# Patient Record
Sex: MALE | Race: ASIAN | NOT HISPANIC OR LATINO
[De-identification: names, ages, dates, MRNs, and addresses within clinical notes are randomized per-mention and may not be internally consistent; named-entity substitution may affect disease eponyms.]

---

## 2017-09-14 PROBLEM — Z00.00 ENCOUNTER FOR PREVENTIVE HEALTH EXAMINATION: Status: ACTIVE | Noted: 2017-09-14

## 2017-09-28 ENCOUNTER — APPOINTMENT (OUTPATIENT)
Dept: ENDOCRINOLOGY | Facility: CLINIC | Age: 42
End: 2017-09-28
Payer: MEDICAID

## 2017-09-28 VITALS
HEART RATE: 114 BPM | SYSTOLIC BLOOD PRESSURE: 124 MMHG | DIASTOLIC BLOOD PRESSURE: 83 MMHG | RESPIRATION RATE: 16 BRPM | WEIGHT: 190.8 LBS

## 2017-09-28 DIAGNOSIS — Z82.62 FAMILY HISTORY OF OSTEOPOROSIS: ICD-10-CM

## 2017-09-28 DIAGNOSIS — F17.200 NICOTINE DEPENDENCE, UNSPECIFIED, UNCOMPLICATED: ICD-10-CM

## 2017-09-28 DIAGNOSIS — Z80.0 FAMILY HISTORY OF MALIGNANT NEOPLASM OF DIGESTIVE ORGANS: ICD-10-CM

## 2017-09-28 DIAGNOSIS — J45.30 MILD PERSISTENT ASTHMA, UNCOMPLICATED: ICD-10-CM

## 2017-09-28 PROCEDURE — 99205 OFFICE O/P NEW HI 60 MIN: CPT | Mod: 25

## 2017-09-28 PROCEDURE — 83036 HEMOGLOBIN GLYCOSYLATED A1C: CPT | Mod: QW

## 2017-09-28 PROCEDURE — 82962 GLUCOSE BLOOD TEST: CPT

## 2017-09-28 RX ORDER — FENOFIBRATE 160 MG/1
160 TABLET ORAL DAILY
Qty: 30 | Refills: 0 | Status: ACTIVE | COMMUNITY
Start: 2017-09-28

## 2017-09-28 RX ORDER — ALBUTEROL SULFATE 90 UG/1
108 (90 BASE) AEROSOL, METERED RESPIRATORY (INHALATION) EVERY 4 HOURS
Qty: 1 | Refills: 0 | Status: ACTIVE | COMMUNITY
Start: 2017-09-28

## 2017-09-28 RX ORDER — EMTRICITABINE AND TENOFOVIR DISOPROXIL FUMARATE 200; 300 MG/1; MG/1
200-300 TABLET, FILM COATED ORAL DAILY
Qty: 30 | Refills: 0 | Status: ACTIVE | COMMUNITY
Start: 2017-09-28

## 2017-09-28 RX ORDER — SIMVASTATIN 20 MG/1
20 TABLET, FILM COATED ORAL
Qty: 30 | Refills: 4 | Status: ACTIVE | COMMUNITY
Start: 2017-09-28

## 2017-11-15 ENCOUNTER — APPOINTMENT (OUTPATIENT)
Dept: ENDOCRINOLOGY | Facility: CLINIC | Age: 42
End: 2017-11-15
Payer: MEDICAID

## 2017-11-15 VITALS — SYSTOLIC BLOOD PRESSURE: 133 MMHG | DIASTOLIC BLOOD PRESSURE: 75 MMHG | HEART RATE: 96 BPM | WEIGHT: 189 LBS

## 2017-11-15 PROCEDURE — 99214 OFFICE O/P EST MOD 30 MIN: CPT

## 2017-11-15 RX ORDER — REPAGLINIDE 1 MG/1
1 TABLET ORAL TWICE DAILY
Qty: 60 | Refills: 0 | Status: DISCONTINUED | COMMUNITY
Start: 2017-09-28 | End: 2017-11-15

## 2017-11-15 RX ORDER — LINAGLIPTIN 5 MG/1
5 TABLET, FILM COATED ORAL DAILY
Qty: 30 | Refills: 6 | Status: DISCONTINUED | COMMUNITY
Start: 2017-09-28 | End: 2017-11-15

## 2018-02-13 ENCOUNTER — TRANSCRIPTION ENCOUNTER (OUTPATIENT)
Age: 43
End: 2018-02-13

## 2018-03-14 ENCOUNTER — APPOINTMENT (OUTPATIENT)
Dept: ENDOCRINOLOGY | Facility: CLINIC | Age: 43
End: 2018-03-14
Payer: MEDICAID

## 2018-03-14 VITALS
DIASTOLIC BLOOD PRESSURE: 69 MMHG | SYSTOLIC BLOOD PRESSURE: 123 MMHG | WEIGHT: 193 LBS | HEIGHT: 71 IN | HEART RATE: 84 BPM | BODY MASS INDEX: 27.02 KG/M2

## 2018-03-14 PROCEDURE — 99214 OFFICE O/P EST MOD 30 MIN: CPT

## 2018-03-14 RX ORDER — METFORMIN ER 500 MG 500 MG/1
500 TABLET ORAL
Qty: 90 | Refills: 0 | Status: DISCONTINUED | COMMUNITY
Start: 2017-09-28 | End: 2018-03-14

## 2018-03-14 RX ORDER — GLIPIZIDE 10 MG/1
10 TABLET, EXTENDED RELEASE ORAL
Qty: 60 | Refills: 5 | Status: DISCONTINUED | COMMUNITY
Start: 2017-09-28 | End: 2018-03-14

## 2018-06-04 ENCOUNTER — APPOINTMENT (OUTPATIENT)
Dept: ENDOCRINOLOGY | Facility: CLINIC | Age: 43
End: 2018-06-04
Payer: MEDICAID

## 2018-06-04 VITALS
HEIGHT: 71 IN | DIASTOLIC BLOOD PRESSURE: 78 MMHG | BODY MASS INDEX: 26.18 KG/M2 | WEIGHT: 187 LBS | SYSTOLIC BLOOD PRESSURE: 139 MMHG | HEART RATE: 100 BPM

## 2018-06-04 PROCEDURE — 99214 OFFICE O/P EST MOD 30 MIN: CPT

## 2018-06-11 ENCOUNTER — MEDICATION RENEWAL (OUTPATIENT)
Age: 43
End: 2018-06-11

## 2018-07-13 ENCOUNTER — LABORATORY RESULT (OUTPATIENT)
Age: 43
End: 2018-07-13

## 2018-07-13 ENCOUNTER — APPOINTMENT (OUTPATIENT)
Dept: NEPHROLOGY | Facility: CLINIC | Age: 43
End: 2018-07-13
Payer: MEDICAID

## 2018-07-13 VITALS — DIASTOLIC BLOOD PRESSURE: 80 MMHG | HEART RATE: 84 BPM | SYSTOLIC BLOOD PRESSURE: 120 MMHG

## 2018-07-13 VITALS — BODY MASS INDEX: 25.94 KG/M2 | WEIGHT: 186 LBS

## 2018-07-13 DIAGNOSIS — G47.00 INSOMNIA, UNSPECIFIED: ICD-10-CM

## 2018-07-13 DIAGNOSIS — G47.33 OBSTRUCTIVE SLEEP APNEA (ADULT) (PEDIATRIC): ICD-10-CM

## 2018-07-13 DIAGNOSIS — L50.1 IDIOPATHIC URTICARIA: ICD-10-CM

## 2018-07-13 DIAGNOSIS — G47.419 NARCOLEPSY W/OUT CATAPLEXY: ICD-10-CM

## 2018-07-13 DIAGNOSIS — R81 GLYCOSURIA: ICD-10-CM

## 2018-07-13 PROCEDURE — 99205 OFFICE O/P NEW HI 60 MIN: CPT

## 2018-07-13 RX ORDER — METFORMIN HYDROCHLORIDE 850 MG/1
850 TABLET, COATED ORAL
Qty: 60 | Refills: 0 | Status: DISCONTINUED | COMMUNITY
Start: 2018-03-14

## 2018-07-13 RX ORDER — 1.1% SODIUM FLUORIDE PRESCRIPTION DENTAL CREAM 5 MG/G
1.1 CREAM DENTAL
Qty: 51 | Refills: 0 | Status: DISCONTINUED | COMMUNITY
Start: 2018-01-11

## 2018-07-13 RX ORDER — SIMVASTATIN 10 MG/1
10 TABLET, FILM COATED ORAL
Qty: 30 | Refills: 0 | Status: DISCONTINUED | COMMUNITY
Start: 2017-02-02 | End: 2018-07-13

## 2018-07-13 RX ORDER — BACITRACIN ZINC 500 [IU]/G
500 OINTMENT TOPICAL
Qty: 28 | Refills: 0 | Status: DISCONTINUED | COMMUNITY
Start: 2018-02-01

## 2018-07-13 RX ORDER — DULAGLUTIDE 0.75 MG/.5ML
0.75 INJECTION, SOLUTION SUBCUTANEOUS
Qty: 2 | Refills: 0 | Status: DISCONTINUED | COMMUNITY
Start: 2018-03-14

## 2018-07-13 RX ORDER — POTASSIUM CITRATE 10 MEQ/1
10 MEQ TABLET, EXTENDED RELEASE ORAL 3 TIMES DAILY
Qty: 90 | Refills: 0 | Status: DISCONTINUED | COMMUNITY
Start: 2017-09-28 | End: 2018-07-13

## 2018-07-20 LAB
CORE LAB FLUID CYTOLOGY: NORMAL
CREAT SPEC-SCNC: 66 MG/DL
CREAT/PROT UR: 1.2 RATIO
PROT UR-MCNC: 78 MG/DL

## 2018-07-23 PROBLEM — Z80.0 FAMILY HISTORY OF COLON CANCER: Status: ACTIVE | Noted: 2017-09-28

## 2018-08-27 ENCOUNTER — APPOINTMENT (OUTPATIENT)
Dept: NEPHROLOGY | Facility: CLINIC | Age: 43
End: 2018-08-27

## 2018-10-10 ENCOUNTER — LABORATORY RESULT (OUTPATIENT)
Age: 43
End: 2018-10-10

## 2018-11-12 ENCOUNTER — APPOINTMENT (OUTPATIENT)
Dept: NEPHROLOGY | Facility: CLINIC | Age: 43
End: 2018-11-12
Payer: MEDICAID

## 2018-11-12 VITALS — HEART RATE: 80 BPM | DIASTOLIC BLOOD PRESSURE: 80 MMHG | SYSTOLIC BLOOD PRESSURE: 116 MMHG

## 2018-11-12 DIAGNOSIS — N18.9 CHRONIC KIDNEY DISEASE, UNSPECIFIED: ICD-10-CM

## 2018-11-12 DIAGNOSIS — E11.65 TYPE 2 DIABETES MELLITUS WITH HYPERGLYCEMIA: ICD-10-CM

## 2018-11-12 PROCEDURE — 99214 OFFICE O/P EST MOD 30 MIN: CPT

## 2018-11-12 RX ORDER — VALSARTAN 80 MG/1
80 TABLET, COATED ORAL DAILY
Qty: 30 | Refills: 0 | Status: DISCONTINUED | COMMUNITY
Start: 2017-09-28 | End: 2018-11-12

## 2018-11-28 ENCOUNTER — APPOINTMENT (OUTPATIENT)
Dept: ENDOCRINOLOGY | Facility: CLINIC | Age: 43
End: 2018-11-28
Payer: MEDICAID

## 2018-11-28 VITALS
HEIGHT: 71 IN | HEART RATE: 104 BPM | BODY MASS INDEX: 24.92 KG/M2 | SYSTOLIC BLOOD PRESSURE: 128 MMHG | DIASTOLIC BLOOD PRESSURE: 81 MMHG | WEIGHT: 178 LBS

## 2018-11-28 PROCEDURE — 99214 OFFICE O/P EST MOD 30 MIN: CPT

## 2019-02-27 ENCOUNTER — TRANSCRIPTION ENCOUNTER (OUTPATIENT)
Age: 44
End: 2019-02-27

## 2019-02-28 ENCOUNTER — TRANSCRIPTION ENCOUNTER (OUTPATIENT)
Age: 44
End: 2019-02-28

## 2019-03-04 ENCOUNTER — APPOINTMENT (OUTPATIENT)
Dept: NEPHROLOGY | Facility: CLINIC | Age: 44
End: 2019-03-04
Payer: MEDICAID

## 2019-03-04 VITALS — SYSTOLIC BLOOD PRESSURE: 122 MMHG | HEART RATE: 86 BPM | DIASTOLIC BLOOD PRESSURE: 84 MMHG

## 2019-03-04 PROCEDURE — 99215 OFFICE O/P EST HI 40 MIN: CPT

## 2019-03-04 RX ORDER — HYDROXYZINE HYDROCHLORIDE 25 MG/1
25 TABLET ORAL 3 TIMES DAILY
Qty: 90 | Refills: 0 | Status: DISCONTINUED | COMMUNITY
Start: 2017-09-28 | End: 2019-03-04

## 2019-03-04 NOTE — HISTORY OF PRESENT ILLNESS
[FreeTextEntry1] : 43 yo man here for f/u evaluation of HTN, CKD 2, hematuria and proteinuria, DMT2, kidney stones\par Since last OV, reports that he had the Flu end of January- recovered except for persistent cough- started on prednisone on 2/26- tapering off now.\par Closely monitoring his glucose levels-- they have been okay- following with Dr. Chan\par Drinking a lot of water for his kidney stones\par Denies flank pain, dysuria, hematuria or frothy urine \par No N, V, F, C\par \par PMH:\par  creat runs about 1.2 1.40 with eGFR 62-70)\par renal sonogram repeat on 7/13/2018- right 11.2; left 10.4 NAP, no f/u for excluding papillary necrosis at this time as creat stabilized\par prior sonogram 2012 (CT done as well)-  \par continues on truvada for PreP.\par + smoker\par

## 2019-03-04 NOTE — PHYSICAL EXAM
[General Appearance - Alert] : alert [General Appearance - In No Acute Distress] : in no acute distress [Sclera] : the sclera and conjunctiva were normal [Extraocular Movements] : extraocular movements were intact [Outer Ear] : the ears and nose were normal in appearance [Examination Of The Oral Cavity] : the lips and gums were normal [Neck Appearance] : the appearance of the neck was normal [Neck Cervical Mass (___cm)] : no neck mass was observed [Jugular Venous Distention Increased] : there was no jugular-venous distention [Auscultation Breath Sounds / Voice Sounds] : lungs were clear to auscultation bilaterally [Heart Rate And Rhythm] : heart rate was normal and rhythm regular [Heart Sounds] : normal S1 and S2 [Heart Sounds Gallop] : no gallops [Murmurs] : no murmurs [Heart Sounds Pericardial Friction Rub] : no pericardial rub [Arterial Pulses Carotid] : carotid pulses were normal with no bruits [Edema] : there was no peripheral edema [Cervical Lymph Nodes Enlarged Anterior Bilaterally] : anterior cervical [Supraclavicular Lymph Nodes Enlarged Bilaterally] : supraclavicular [No CVA Tenderness] : no ~M costovertebral angle tenderness [No Spinal Tenderness] : no spinal tenderness [] : no rash [No Focal Deficits] : no focal deficits [Oriented To Time, Place, And Person] : oriented to person, place, and time [Impaired Insight] : insight and judgment were intact [Affect] : the affect was normal

## 2019-03-04 NOTE — ASSESSMENT
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\par - HTN - BP  acceptable for today- on prednisone- target BP < 125/80\par --CKD 2- creat stable range, electrolytes okay\par etiology of CKD remains  unclear- probable IgA nephropathy,\par defer kidney biopsy unless worsening of creat, proteinuria or hematuria\par proteinuria-  re-quantify- c/w losartan- consider increase to 100 mg for proteinuria and may help BP-\par --hematuria- (smoker) urine cytology negative\par DifDx includes  kidney stones;  IgA nephropathy; papillary necrosis; nephrocalcinosis- as above, defer kidney biopsy unless worsening azotemia-\par -smoking cessation provided- very important that he stops smoking\par - kidney stones- reviewed report in detail-\par needs increase in water itake to maintain a urne volume of > 2 Liter daily\par reduce oxalate intake- information sheet provided. Consider adding potassium Citrate tabs - defer for now as on many medications and urine citrate in 600s\par - diabetes- A1C-  controlled\par -hypercalcemia- for repeat data; stop multi Vit if his product has Vit D or Calcium in it- he will inform me-\par \par f/u Dr. Chan 2 months-  \par here 4-5 months\par \par \par

## 2019-05-13 ENCOUNTER — TRANSCRIPTION ENCOUNTER (OUTPATIENT)
Age: 44
End: 2019-05-13

## 2019-05-13 ENCOUNTER — MEDICATION RENEWAL (OUTPATIENT)
Age: 44
End: 2019-05-13

## 2019-05-14 ENCOUNTER — RX RENEWAL (OUTPATIENT)
Age: 44
End: 2019-05-14

## 2019-05-28 ENCOUNTER — APPOINTMENT (OUTPATIENT)
Dept: ENDOCRINOLOGY | Facility: CLINIC | Age: 44
End: 2019-05-28
Payer: MEDICAID

## 2019-05-28 VITALS
SYSTOLIC BLOOD PRESSURE: 103 MMHG | DIASTOLIC BLOOD PRESSURE: 65 MMHG | BODY MASS INDEX: 24.83 KG/M2 | WEIGHT: 178 LBS | HEART RATE: 97 BPM

## 2019-05-28 PROCEDURE — 99214 OFFICE O/P EST MOD 30 MIN: CPT

## 2019-05-29 NOTE — ASSESSMENT
[FreeTextEntry1] : Diabetes, at goal (goal A1c < 7.0.%)   Proteinuria, on ARB.  continue current regimen. \par Explained steroid effect on glucose and disproportionately high glucose post meal (which is what he had).  If he needs steroids again, I can prescribe short term glimepiride to normalize post prandial hyperglycemia. \par \par MIld hypercalcemia.  he is only taking multivitamin for supplement.  PTH 11, so not PTH mediated. advised to drink more water, avoid dehydration.\par RTO 6 months

## 2019-05-29 NOTE — HISTORY OF PRESENT ILLNESS
[FreeTextEntry1] : while in [place], got Athlete's foot and a sinus infection\par treated with prednisone for 5 days in February.  glucose went into low 200s but then came back to normal at next glucosse check and he did not have symptoms of hyperglycemia.\par glucose log reviewed: . mostly 120-150.  testing mostly before breakfast, some times after dinner.\par walking every day\par no polyuria, polydipsia, SOB, chest pain, or neuropathy symptoms \par up to date with ophtho, saw in winter.\par was recommended to eat low oxalate diet but admits he's not really following this\par \par \par no FH of diabetes or kidney disease\par \par Meds:\par metformin 500mg tid (diarrhea on the 850 mg dose)\par Trulicity 1.5mg/week, Jardiance 25mg\par Ventolin prn, Symbicort 80/4.5, montelukast 10mg\par K citrate, losartan 50mg\par simvastatin 20mg, fenofibrate 160mg\par Allegra prn, hydroxyzine prn\par Truvada Prep\par zolpidem 10mg prn\par Nuvigial 150mg/day\par Mvi, B12, coQ10, psyllium, fish oil

## 2019-05-29 NOTE — DATA REVIEWED
[FreeTextEntry1] : 5/19: A1c 6.9%, Cr 1.17, tot chol 188, HDL 44, , trig 161, urine microalbumin/cr 374, Ca 10.5\par 5/19: Ca 10.4, PTH 11, Cr 1.19, 1,25D 44, 25D 31\par 10/18: A1c 7.0%, tot chol 154, HDL 35, LDL 92, trig 177, urine microalb/cr 241, Cr 1.37\par 6/18:  A1c 7.5%, Cr 1.24, tot chol 164, HDL 30, LDL 88, trig 342, urine microalb/cr 437\par 3/18: A1c 6.9%, tot chol 139, trig 151, HDL 30, LDL 84, Cr 1.15, K 5.7, urine microalb/cr 165\par 12/17: A1c 6.8%, tot chol 160, trig 231, HDL 30, LDL 95, urine microalb/cr 268\par 10/17: TSH 1.78, C peptide 3.83, prolactin 2.8, 25D 39, ICA neg, [place] 65 neg\par 9/17: A1c 7.2%, urine microalb/cr 97, tot chol 205, HDL 30, LDl 132, trig 277\par 6/17: A1c 8.4%,  tot chol 153, trig 212, HDL 32, LDL 79\par 3/17: tot chol 156, HDL 28, LDL 76, trig 258, urine microalb/cr 148, HIV neg

## 2019-05-29 NOTE — PHYSICAL EXAM
[Healthy Appearance] : healthy appearance [Alert] : alert [Normal Voice/Communication] : normal voice communication [No Proptosis] : no proptosis [No Lid Lag] : no lid lag [Normal Hearing] : hearing was normal [Thyroid Not Enlarged] : the thyroid was not enlarged [No Thyroid Nodules] : there were no palpable thyroid nodules [Clear to Auscultation] : lungs were clear to auscultation bilaterally [Normal S1, S2] : normal S1 and S2 [Regular Rhythm] : with a regular rhythm [Pedal Pulses Normal] : the pedal pulses are present [No Edema] : there was no peripheral edema [Normal Sensation on Monofilament Testing] : normal sensation on monofilament testing of lower extremities [Normal Affect] : the affect was normal [Normal Mood] : the mood was normal [de-identified] : no onychomycoses, mild acanthosis nigricans [Foot Ulcers] : no foot ulcers

## 2019-08-19 ENCOUNTER — APPOINTMENT (OUTPATIENT)
Dept: NEPHROLOGY | Facility: CLINIC | Age: 44
End: 2019-08-19
Payer: MEDICAID

## 2019-08-19 VITALS
SYSTOLIC BLOOD PRESSURE: 110 MMHG | DIASTOLIC BLOOD PRESSURE: 70 MMHG | WEIGHT: 176 LBS | BODY MASS INDEX: 24.55 KG/M2 | HEART RATE: 86 BPM

## 2019-08-19 PROCEDURE — 99214 OFFICE O/P EST MOD 30 MIN: CPT

## 2019-08-19 RX ORDER — BUDESONIDE AND FORMOTEROL FUMARATE DIHYDRATE 80; 4.5 UG/1; UG/1
80-4.5 AEROSOL RESPIRATORY (INHALATION)
Qty: 1 | Refills: 0 | Status: ACTIVE | COMMUNITY
Start: 2017-09-28

## 2019-08-19 NOTE — PHYSICAL EXAM
[General Appearance - Alert] : alert [Sclera] : the sclera and conjunctiva were normal [General Appearance - In No Acute Distress] : in no acute distress [Outer Ear] : the ears and nose were normal in appearance [Extraocular Movements] : extraocular movements were intact [Examination Of The Oral Cavity] : the lips and gums were normal [Neck Appearance] : the appearance of the neck was normal [Neck Cervical Mass (___cm)] : no neck mass was observed [Jugular Venous Distention Increased] : there was no jugular-venous distention [Auscultation Breath Sounds / Voice Sounds] : lungs were clear to auscultation bilaterally [Heart Rate And Rhythm] : heart rate was normal and rhythm regular [Heart Sounds] : normal S1 and S2 [Heart Sounds Gallop] : no gallops [Heart Sounds Pericardial Friction Rub] : no pericardial rub [Murmurs] : no murmurs [Arterial Pulses Carotid] : carotid pulses were normal with no bruits [Edema] : there was no peripheral edema [Supraclavicular Lymph Nodes Enlarged Bilaterally] : supraclavicular [Cervical Lymph Nodes Enlarged Anterior Bilaterally] : anterior cervical [No Spinal Tenderness] : no spinal tenderness [No CVA Tenderness] : no ~M costovertebral angle tenderness [No Focal Deficits] : no focal deficits [] : no rash [Oriented To Time, Place, And Person] : oriented to person, place, and time [Affect] : the affect was normal [Impaired Insight] : insight and judgment were intact

## 2019-08-20 LAB
CREAT SPEC-SCNC: 71 MG/DL
CREAT/PROT UR: 1.3 RATIO
PROT UR-MCNC: 88 MG/DL

## 2019-08-20 NOTE — ASSESSMENT
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 CO2, okay; c/w jardiance\par etiology of CKD remains  unclear- probable IgA nephropathy; continue to defer kidney biopsy unless rising creat, worsening proteinuria\par proteinuria-  Uprot/creat 1.3 will increase losartan if remains > 1 gm- (not now as BP on low side)\par --hematuria- (smoker) urine cytology negative- counseled on need to stop smoking\par -smoking cessation provided- very important that he stops smoking\par - kidney stones reminded to increase water intake\par - diabetes- A1C-  controlled\par \par f/u 4 months\par \par

## 2019-08-20 NOTE — HISTORY OF PRESENT ILLNESS
[FreeTextEntry1] : 43 yo man with HTN, CKD 2, hematuria and proteinuria, DMT2, kidney stones, here for f/u evaluation.\par no new interim medical events\par Closely monitoring his glucose levels, and drinking a lot of water for his kidney stones\par Denies flank pain, dysuria, hematuria or frothy urine \par No N, V, F, C\par \par PMH:\par  creat runs about 1.2 1.40 with eGFR 62-70\par sono raise question of papillary necrosis\par renal sonogram repeat on 7/13/2018- right 11.2; left 10.4 NAP, no f/u for excluding papillary necrosis as creat stabilized\par prior sonogram 2012 (CT done as well)-  \par continues on truvada for PreP.\par + smoker\par

## 2019-10-22 ENCOUNTER — MEDICATION RENEWAL (OUTPATIENT)
Age: 44
End: 2019-10-22

## 2019-11-26 ENCOUNTER — APPOINTMENT (OUTPATIENT)
Dept: ENDOCRINOLOGY | Facility: CLINIC | Age: 44
End: 2019-11-26
Payer: MEDICAID

## 2019-11-26 VITALS
WEIGHT: 175 LBS | BODY MASS INDEX: 24.41 KG/M2 | SYSTOLIC BLOOD PRESSURE: 136 MMHG | HEART RATE: 100 BPM | DIASTOLIC BLOOD PRESSURE: 75 MMHG

## 2019-11-26 PROCEDURE — 99214 OFFICE O/P EST MOD 30 MIN: CPT

## 2019-11-26 NOTE — DATA REVIEWED
[FreeTextEntry1] : 11/19: A1c 6.8%, tot chol 170, HDL 40, , trig 151, TSH 1.04, 25D 30, urine microalbumin/cr 415\par 8/19: A1c 7.0%, tot chol 169, HDL 43, , trig 137, TSH 2.03, urine microalbumin /cr 428\par 5/19: A1c 6.9%, Cr 1.17, tot chol 188, HDL 44, , trig 161, urine microalbumin/cr 374, Ca 10.5\par 5/19: Ca 10.4, PTH 11, Cr 1.19, 1,25D 44, 25D 31\par 10/18: A1c 7.0%, tot chol 154, HDL 35, LDL 92, trig 177, urine microalb/cr 241, Cr 1.37\par 6/18:  A1c 7.5%, Cr 1.24, tot chol 164, HDL 30, LDL 88, trig 342, urine microalb/cr 437\par 3/18: A1c 6.9%, tot chol 139, trig 151, HDL 30, LDL 84, Cr 1.15, K 5.7, urine microalb/cr 165\par 12/17: A1c 6.8%, tot chol 160, trig 231, HDL 30, LDL 95, urine microalb/cr 268\par 10/17: TSH 1.78, C peptide 3.83, prolactin 2.8, 25D 39, ICA neg, [place] 65 neg\par 9/17: A1c 7.2%, urine microalb/cr 97, tot chol 205, HDL 30, LDl 132, trig 277\par 6/17: A1c 8.4%,  tot chol 153, trig 212, HDL 32, LDL 79\par 3/17: tot chol 156, HDL 28, LDL 76, trig 258, urine microalb/cr 148, HIV neg

## 2019-11-26 NOTE — PHYSICAL EXAM
[Healthy Appearance] : healthy appearance [Alert] : alert [Normal Voice/Communication] : normal voice communication [Thyroid Not Enlarged] : the thyroid was not enlarged [No Lid Lag] : no lid lag [Normal Hearing] : hearing was normal [No Proptosis] : no proptosis [No Thyroid Nodules] : there were no palpable thyroid nodules [Clear to Auscultation] : lungs were clear to auscultation bilaterally [Normal S1, S2] : normal S1 and S2 [Regular Rhythm] : with a regular rhythm [No Edema] : there was no peripheral edema [Pedal Pulses Normal] : the pedal pulses are present [Foot Ulcers] : no foot ulcers [Normal Sensation on Monofilament Testing] : normal sensation on monofilament testing of lower extremities [Normal Mood] : the mood was normal [Normal Affect] : the affect was normal [de-identified] : no onychomycoses, mild acanthosis nigricans

## 2019-11-26 NOTE — HISTORY OF PRESENT ILLNESS
[FreeTextEntry1] : no health issues since last visit except for developing dribbling after urination and some incontinence\par no irritation, itching or infection.  no hesitancy, frequency or polyuria\par glucose log reviewed:  120-160 pre breakfast.  108-150 before bedtime.  testing once/day\par higher am glucose if he eats late at night or has bigger dinner\par will see ophtho in December\par no polyuria, polydipsia, SOB, chest pain, or neuropathy symptoms \par got flu vaccine\par \par no FH of diabetes or kidney disease\par \par Meds:\par metformin 500mg tid (diarrhea on the 850 mg dose)\par Trulicity 1.5mg/week, Jardiance 25mg\par Ventolin prn, Symbicort 80/4.5, montelukast 10mg\par K citrate, losartan 50mg\par simvastatin 20mg, fenofibrate 160mg\par Allegra prn, hydroxyzine prn\par Truvada Prep\par zolpidem 10mg prn\par Nuvigial 150mg/day\par Mvi, B12, coQ10, psyllium, fish oil

## 2019-11-26 NOTE — ASSESSMENT
[FreeTextEntry1] : Diabetes, at goal (goal A1c < 7.0.%)   Proteinuria, on ARB.  continue current regimen. \par Discussed that Jardiance increases glucose in the urine and can cause polyuria.  It should not cause dribbling,  but if he is having incontinence, I worry about risk of urinary infection or skin infection (from glucose in the urine on his skin).  Since A1c is right at goal, I also worry if he stops this medication, he may need something else in its place, or else A1c will go over 7.0.  would consider pioglitazone if needed.\par recommended increasing exercise (goal 30 min/day, or 10K steps/day) and stop eating 3 hours before sleeping.\par RTO 6 months

## 2019-12-26 ENCOUNTER — TRANSCRIPTION ENCOUNTER (OUTPATIENT)
Age: 44
End: 2019-12-26

## 2019-12-26 ENCOUNTER — MEDICATION RENEWAL (OUTPATIENT)
Age: 44
End: 2019-12-26

## 2020-01-02 ENCOUNTER — RX RENEWAL (OUTPATIENT)
Age: 45
End: 2020-01-02

## 2020-02-10 ENCOUNTER — APPOINTMENT (OUTPATIENT)
Dept: NEPHROLOGY | Facility: CLINIC | Age: 45
End: 2020-02-10
Payer: MEDICAID

## 2020-02-10 VITALS — DIASTOLIC BLOOD PRESSURE: 74 MMHG | SYSTOLIC BLOOD PRESSURE: 110 MMHG | HEART RATE: 88 BPM

## 2020-02-10 PROCEDURE — 99214 OFFICE O/P EST MOD 30 MIN: CPT

## 2020-02-10 NOTE — PHYSICAL EXAM
[General Appearance - In No Acute Distress] : in no acute distress [General Appearance - Alert] : alert [Sclera] : the sclera and conjunctiva were normal [Extraocular Movements] : extraocular movements were intact [Outer Ear] : the ears and nose were normal in appearance [Examination Of The Oral Cavity] : the lips and gums were normal [Neck Appearance] : the appearance of the neck was normal [Neck Cervical Mass (___cm)] : no neck mass was observed [Auscultation Breath Sounds / Voice Sounds] : lungs were clear to auscultation bilaterally [Jugular Venous Distention Increased] : there was no jugular-venous distention [Heart Sounds] : normal S1 and S2 [Heart Rate And Rhythm] : heart rate was normal and rhythm regular [Heart Sounds Gallop] : no gallops [Murmurs] : no murmurs [Heart Sounds Pericardial Friction Rub] : no pericardial rub [Arterial Pulses Carotid] : carotid pulses were normal with no bruits [Edema] : there was no peripheral edema [Supraclavicular Lymph Nodes Enlarged Bilaterally] : supraclavicular [Cervical Lymph Nodes Enlarged Anterior Bilaterally] : anterior cervical [No CVA Tenderness] : no ~M costovertebral angle tenderness [No Spinal Tenderness] : no spinal tenderness [Oriented To Time, Place, And Person] : oriented to person, place, and time [No Focal Deficits] : no focal deficits [] : no rash [Affect] : the affect was normal [Impaired Insight] : insight and judgment were intact

## 2020-02-11 NOTE — HISTORY OF PRESENT ILLNESS
[FreeTextEntry1] : 44 yo man here for f/u evaluation of HTN, CKD 2, hematuria and proteinuria, DMT2, kidney stones.\par newly notes some dripping after voiding- saw  some enlargement of prostate- has decided to defer any medications for the issue at this time\par energy appetite stable\par Denies flank pain, dysuria, hematuria or frothy urine \par No N, V, F, C\par \par PMH:\par  creat runs about 1.2 1.40 with eGFR 62-70\par sono raise question of papillary necrosis\par renal sonogram repeat on 7/13/2018- right 11.2; left 10.4 NAP, no f/u for excluding papillary necrosis as creat stabilized\par prior sonogram 2012 (CT done as well)-  \par continues on truvada for PreP.\par + smoker\par

## 2020-02-11 NOTE — ASSESSMENT
[FreeTextEntry1] : all lab data was reviewed with patient in detail from 11/13/2019\par 44 yo man with CKD 2, hematuria, proteinuria; DMT2, kidney stones; \par prior concern for renal papillary necrosis right kidney and nephrocalcinosis noted on prior renal sonogram (2018)- creatinine has been stable\par - HTN - BP at goal c/w present regimen\par --CKD 2- creat  1.28- remains in acceptable range; K. CO2, WNL\par etiology of CKD remains  unclear- probable IgA nephropathy; continue to defer kidney biopsy unless rising creat, worsening proteinuria\par proteinuria-  Umicroalb/creat  415- lower; c/w losartan\par --hematuria- (smoker) urine cytology negative- counseled on need to stop smoking- needs repeat u/a\par -DM- by A1C 6.8%- \par -smoking cessation provided- very important that he stops smoking\par - kidney stones reminded to increase water intake\par \par \par f/u 4-6  months\par \par

## 2020-02-13 ENCOUNTER — TRANSCRIPTION ENCOUNTER (OUTPATIENT)
Age: 45
End: 2020-02-13

## 2020-02-13 RX ORDER — EMPAGLIFLOZIN 25 MG/1
25 TABLET, FILM COATED ORAL
Qty: 30 | Refills: 5 | Status: DISCONTINUED | COMMUNITY
Start: 2017-09-28 | End: 2020-02-13

## 2020-05-26 ENCOUNTER — APPOINTMENT (OUTPATIENT)
Dept: ENDOCRINOLOGY | Facility: CLINIC | Age: 45
End: 2020-05-26
Payer: MEDICAID

## 2020-05-26 VITALS
HEIGHT: 71 IN | WEIGHT: 161 LBS | BODY MASS INDEX: 22.54 KG/M2 | SYSTOLIC BLOOD PRESSURE: 111 MMHG | HEART RATE: 108 BPM | DIASTOLIC BLOOD PRESSURE: 74 MMHG

## 2020-05-26 PROCEDURE — 99214 OFFICE O/P EST MOD 30 MIN: CPT

## 2020-05-26 NOTE — ASSESSMENT
[FreeTextEntry1] : Diabetes, at goal (goal A1c < 7.0.%)   Proteinuria, on ARB.  A1c significantly lower between Feb and May, probably due to getting braces because he has been taking the same diabetes medications for at least the past year.\par Since A1c now < 6%, will reduce metformin to BID dosing.\par if A1c still < 6% next visit, will reduce Trulicity dose.\par encouraged increased physical activity, so is not sitting all day.\par continue statin and fibrate for lipids\par RTO 6 months

## 2020-05-26 NOTE — DATA REVIEWED
[FreeTextEntry1] : 5/20: A1c 5.9%, tot chol 125, trig 114, HDL 43, LDL 62, Cr 1.22\par 2/20: A1c 6.8%, tot chol 162, trig 124, HDL 42, LDL 97, Cr 1.27\par 11/19: A1c 6.8%,  tot chol 170, HDL 40, , trig 151, TSH 1.04, urine microalbumin/cr 415, 25D 30\par 5/19: A1c 6.9%, Cr 1.17, tot chol 188, HDL 44, , trig 161, urine microalbumin/cr 374, Ca 10.5\par 5/19: Ca 10.4, PTH 11, Cr 1.19, 1,25D 44, 25D 31\par 10/18: A1c 7.0%, tot chol 154, HDL 35, LDL 92, trig 177, urine microalb/cr 241, Cr 1.37\par 6/18:  A1c 7.5%, Cr 1.24, tot chol 164, HDL 30, LDL 88, trig 342, urine microalb/cr 437\par 3/18: A1c 6.9%, tot chol 139, trig 151, HDL 30, LDL 84, Cr 1.15, K 5.7, urine microalb/cr 165\par 12/17: A1c 6.8%, tot chol 160, trig 231, HDL 30, LDL 95, urine microalb/cr 268\par 10/17: TSH 1.78, C peptide 3.83, prolactin 2.8, 25D 39, ICA neg, [place] 65 neg\par 9/17: A1c 7.2%, urine microalb/cr 97, tot chol 205, HDL 30, LDl 132, trig 277\par 6/17: A1c 8.4%,  tot chol 153, trig 212, HDL 32, LDL 79\par 3/17: tot chol 156, HDL 28, LDL 76, trig 258, urine microalb/cr 148, HIV neg

## 2020-05-26 NOTE — HISTORY OF PRESENT ILLNESS
[FreeTextEntry1] : got braces and he doesn't like to have food stuck in his teeth, so he has been eating less\par snacking less and cooking for himself during pandemic.  Lost 15lb since last visit.\par goes out once a week to grocery shop\par did not see any difference changing Jardiance to Steglatro\par no hypoglycemia symptoms.  glucose mostly < 125.  range .\par no polyuria, polydipsia, SOB, chest pain, or neuropathy symptoms \par saw ophtho in December\par saw urologist who offered to start medication for dribbling (if pt wanted) but since symptom not bad/tolerable, pt preferred not to add more medication.\par \par no FH of diabetes or kidney disease\par \par Meds:\par metformin 500mg tid (diarrhea on the 850 mg dose)\par Trulicity 1.5mg/week,  steglatro  15mg\par Ventolin prn, Symbicort 80/4.5, montelukast 10mg\par K citrate, losartan 50mg\par simvastatin 20mg, fenofibrate 160mg\par Allegra prn, hydroxyzine prn\par Truvada Prep\par zolpidem 10mg prn\par Nuvigial 150mg/day\par Mvi, B12, coQ10, psyllium, fish oil\par Previous meds: Jardiance (changed to Steglatro)

## 2020-05-26 NOTE — PHYSICAL EXAM
[Alert] : alert [Healthy Appearance] : healthy appearance [Normal Hearing] : hearing was normal [No LAD] : no lymphadenopathy [Thyroid Not Enlarged] : the thyroid was not enlarged [Clear to Auscultation] : lungs were clear to auscultation bilaterally [Normal S1, S2] : normal S1 and S2 [No Edema] : no peripheral edema [Regular Rhythm] : with a regular rhythm [Pedal Pulses Normal] : the pedal pulses are present [Normal Sensation on Monofilament Testing] : normal sensation on monofilament testing of lower extremities [Normal Mood] : the mood was normal [Normal Affect] : the affect was normal [Acanthosis Nigricans] : no acanthosis nigricans [Foot Ulcers] : no foot ulcers [de-identified] : no onychomycoses

## 2020-06-09 ENCOUNTER — RX RENEWAL (OUTPATIENT)
Age: 45
End: 2020-06-09

## 2020-08-10 ENCOUNTER — APPOINTMENT (OUTPATIENT)
Dept: NEPHROLOGY | Facility: CLINIC | Age: 45
End: 2020-08-10
Payer: MEDICAID

## 2020-08-10 VITALS — HEART RATE: 106 BPM | SYSTOLIC BLOOD PRESSURE: 115 MMHG | DIASTOLIC BLOOD PRESSURE: 73 MMHG

## 2020-08-10 PROCEDURE — 99214 OFFICE O/P EST MOD 30 MIN: CPT

## 2020-08-10 RX ORDER — OMALIZUMAB 202.5 MG/1.4ML
150 INJECTION, SOLUTION SUBCUTANEOUS
Refills: 0 | Status: ACTIVE | COMMUNITY

## 2020-08-10 RX ORDER — FEXOFENADINE HYDROCHLORIDE 180 MG/1
180 TABLET ORAL
Qty: 30 | Refills: 0 | Status: DISCONTINUED | COMMUNITY
Start: 2017-03-01 | End: 2020-08-10

## 2020-08-10 NOTE — ASSESSMENT
[FreeTextEntry1] : all lab data was reviewed with patient in detail from 5/12/2020\par 46 yo man with CKD 2, hematuria, proteinuria; DMT2, kidney stones; \par prior concern for renal papillary necrosis right kidney and nephrocalcinosis noted on prior renal sonogram (2018)- creatinine has been stable\par - HTN - BP controlled; c/w present regimen\par --CKD 2- creat  1.22- stable; electrolytes good\par etiology of CKD remains  unclear- probable IgA nephropathy; continue to defer kidney biopsy unless rising creat, worsening proteinuria\par proteinuria-  needs repeat u/a with Uprot/creat ratio- Rx given- prefers to leave a urine sample when he sees his PCP in a few weeks- okay- reminded to tell staff there to forward results; c/w losartan\par --hematuria- (smoker) urine cytology negative- again counseled on need to stop smoking- \par does see  as well\par defer kidney biopsy creat stable\par -DM- by A1C down to 5.9% from  6.8%-  metformin reduced to2 tabs from 3 tabs daily\par -smoking cessation provided- again emphasized that it is very important that he stops smoking for overall health and well being\par - kidney stones last 24 hour urine for litholink analysis was 11/2018- repeat towards end of year or beginning of 2021;  reminded to maintain U volume > 2L daily; \par -prior hypercalcemia with Ca 10.6- at that time instructed to dc any Vit D and Ca supplements- stopped his multiVits last OV- should continue off present ca 10.2- okay\par  \par f/u 4-6  months\par \par

## 2020-08-10 NOTE — PHYSICAL EXAM
[General Appearance - Alert] : alert [General Appearance - In No Acute Distress] : in no acute distress [Outer Ear] : the ears and nose were normal in appearance [Sclera] : the sclera and conjunctiva were normal [Extraocular Movements] : extraocular movements were intact [Examination Of The Oral Cavity] : the lips and gums were normal [Neck Cervical Mass (___cm)] : no neck mass was observed [Neck Appearance] : the appearance of the neck was normal [Auscultation Breath Sounds / Voice Sounds] : lungs were clear to auscultation bilaterally [Jugular Venous Distention Increased] : there was no jugular-venous distention [Heart Sounds] : normal S1 and S2 [Heart Rate And Rhythm] : heart rate was normal and rhythm regular [Heart Sounds Gallop] : no gallops [Murmurs] : no murmurs [Heart Sounds Pericardial Friction Rub] : no pericardial rub [Edema] : there was no peripheral edema [Arterial Pulses Carotid] : carotid pulses were normal with no bruits [Cervical Lymph Nodes Enlarged Anterior Bilaterally] : anterior cervical [Supraclavicular Lymph Nodes Enlarged Bilaterally] : supraclavicular [No CVA Tenderness] : no ~M costovertebral angle tenderness [] : no rash [No Spinal Tenderness] : no spinal tenderness [No Focal Deficits] : no focal deficits [Affect] : the affect was normal [Impaired Insight] : insight and judgment were intact [Oriented To Time, Place, And Person] : oriented to person, place, and time

## 2020-08-10 NOTE — HISTORY OF PRESENT ILLNESS
[FreeTextEntry1] : 44 yo man with HTN, CKD 2, hematuria and proteinuria, DMT2, kidney stones, here for f/u evaluation.\par has been coping with lock down for COVID- \par has not been out much, other than helping get groceries for his 91 yo mom\par Denies flank pain, dysuria, hematuria or frothy urine \par still smoking\par No N, V, F, C\par \par PMH:\par  creat runs about 1.2 1.40 with eGFR 62-70\par sono raise question of papillary necrosis\par renal sonogram repeat on 7/13/2018- right 11.2; left 10.4 NAP, no f/u for excluding papillary necrosis as creat stabilized\par prior sonogram 2012 (CT done as well)-  \par continues on truvada for PreP.\par + smoker\par

## 2020-10-09 ENCOUNTER — TRANSCRIPTION ENCOUNTER (OUTPATIENT)
Age: 45
End: 2020-10-09

## 2020-10-12 ENCOUNTER — TRANSCRIPTION ENCOUNTER (OUTPATIENT)
Age: 45
End: 2020-10-12

## 2020-10-21 ENCOUNTER — RX RENEWAL (OUTPATIENT)
Age: 45
End: 2020-10-21

## 2020-11-09 ENCOUNTER — TRANSCRIPTION ENCOUNTER (OUTPATIENT)
Age: 45
End: 2020-11-09

## 2020-11-10 ENCOUNTER — TRANSCRIPTION ENCOUNTER (OUTPATIENT)
Age: 45
End: 2020-11-10

## 2020-11-16 ENCOUNTER — RX RENEWAL (OUTPATIENT)
Age: 45
End: 2020-11-16

## 2020-11-17 ENCOUNTER — APPOINTMENT (OUTPATIENT)
Dept: ENDOCRINOLOGY | Facility: CLINIC | Age: 45
End: 2020-11-17
Payer: MEDICAID

## 2020-11-17 VITALS
WEIGHT: 156 LBS | HEART RATE: 88 BPM | BODY MASS INDEX: 21.76 KG/M2 | SYSTOLIC BLOOD PRESSURE: 115 MMHG | DIASTOLIC BLOOD PRESSURE: 77 MMHG

## 2020-11-17 PROCEDURE — 99214 OFFICE O/P EST MOD 30 MIN: CPT | Mod: 25

## 2020-11-17 NOTE — HISTORY OF PRESENT ILLNESS
[FreeTextEntry1] : still losing weight.  primary doctor sent him for CXR just in case (was negative)\par had hematuria, was sent for CT abdomen, no stones but found to have gallstones\par sugars reviewed:   mostly.  max 177 while on prednisone for CT scan (was prescribed due to h/o asthma even though he has had contrast before without problem)\par no hypoglycemia symptoms.  \par no polyuria, polydipsia,  chest pain, or neuropathy symptoms \par some SOB and Symbicort dose was increased\par  ophtho due in December\par got flu vacine\par \par no FH of diabetes or kidney disease\par \par Meds:\par metformin 500mg BID(diarrhea on the 850 mg dose)\par Trulicity 1.5mg/week,  steglatro  15mg\par Ventolin prn, Symbicort 80/4.5, montelukast 10mg\par K citrate, losartan 50mg\par simvastatin 20mg, fenofibrate 160mg\par Allegra prn, hydroxyzine prn\par Truvada Prep\par zolpidem 10mg prn\par Nuvigial 150mg/day\par Mvi, B12, coQ10, psyllium, fish oil\par Previous meds: Jardiance (changed to Steglatro)

## 2020-11-17 NOTE — DATA REVIEWED
[FreeTextEntry1] : 5/20: A1c 5.9%, tot chol 125, HDL 43,trig 114, LDL 62, Cr 1.22\par 2/20: A1c 6.8%, tot chol 162, trig 124, HDL 42, LDL 97, Cr 1.27\par 11/19: A1c 6.8%,  tot chol 170, HDL 40, , trig 151, TSH 1.04, urine microalbumin/cr 415, 25D 30\par 5/19: A1c 6.9%, Cr 1.17, tot chol 188, HDL 44, , trig 161, urine microalbumin/cr 374, Ca 10.5\par 5/19: Ca 10.4, PTH 11, Cr 1.19, 1,25D 44, 25D 31\par 10/18: A1c 7.0%, tot chol 154, HDL 35, LDL 92, trig 177, urine microalb/cr 241, Cr 1.37\par 6/18:  A1c 7.5%, Cr 1.24, tot chol 164, HDL 30, LDL 88, trig 342, urine microalb/cr 437\par 3/18: A1c 6.9%, tot chol 139, trig 151, HDL 30, LDL 84, Cr 1.15, K 5.7, urine microalb/cr 165\par 12/17: A1c 6.8%, tot chol 160, trig 231, HDL 30, LDL 95, urine microalb/cr 268\par 10/17: TSH 1.78, C peptide 3.83, prolactin 2.8, 25D 39, ICA neg, [place] 65 neg\par 9/17: A1c 7.2%, urine microalb/cr 97, tot chol 205, HDL 30, LDl 132, trig 277\par 6/17: A1c 8.4%,  tot chol 153, trig 212, HDL 32, LDL 79\par 3/17: tot chol 156, HDL 28, LDL 76, trig 258, urine microalb/cr 148, HIV neg

## 2020-11-17 NOTE — PHYSICAL EXAM
[Alert] : alert [Healthy Appearance] : healthy appearance [Normal Hearing] : hearing was normal [No LAD] : no lymphadenopathy [Thyroid Not Enlarged] : the thyroid was not enlarged [Clear to Auscultation] : lungs were clear to auscultation bilaterally [Normal S1, S2] : normal S1 and S2 [Regular Rhythm] : with a regular rhythm [No Edema] : no peripheral edema [Pedal Pulses Normal] : the pedal pulses are present [Normal Sensation on Monofilament Testing] : normal sensation on monofilament testing of lower extremities [Normal Affect] : the affect was normal [Normal Mood] : the mood was normal [Acanthosis Nigricans] : no acanthosis nigricans [Foot Ulcers] : no foot ulcers [de-identified] : no onychomycoses

## 2020-11-17 NOTE — ASSESSMENT
[FreeTextEntry1] : Diabetes, at goal (goal A1c < 7.0.%)   Proteinuria, on ARB.  \par A1c well below goal , and now the past 2 visits.  Can discontinue Steglatro and monitor sugars.\par if sugars more than 20mgg/dl higher, will restart but at lower dose, 5mg/day\par I am choosing to continue Trulicity over Steglatro because Trulicity studies showed cardiovascular benefit, whereas Steglatro trials fell short of showing significant reduction in MACE.\par continue metformin.\par continue statin therapy\par RTO 6 months

## 2020-12-28 ENCOUNTER — TRANSCRIPTION ENCOUNTER (OUTPATIENT)
Age: 45
End: 2020-12-28

## 2021-03-04 ENCOUNTER — APPOINTMENT (OUTPATIENT)
Dept: NEPHROLOGY | Facility: CLINIC | Age: 46
End: 2021-03-04
Payer: MEDICAID

## 2021-03-04 VITALS — SYSTOLIC BLOOD PRESSURE: 118 MMHG | DIASTOLIC BLOOD PRESSURE: 73 MMHG | HEART RATE: 103 BPM

## 2021-03-04 PROCEDURE — 99072 ADDL SUPL MATRL&STAF TM PHE: CPT

## 2021-03-04 PROCEDURE — 99213 OFFICE O/P EST LOW 20 MIN: CPT

## 2021-03-04 NOTE — PHYSICAL EXAM
[General Appearance - Alert] : alert [General Appearance - In No Acute Distress] : in no acute distress [Auscultation Breath Sounds / Voice Sounds] : lungs were clear to auscultation bilaterally [Heart Rate And Rhythm] : heart rate was normal and rhythm regular [Heart Sounds] : normal S1 and S2 [Heart Sounds Gallop] : no gallops [Murmurs] : no murmurs [Heart Sounds Pericardial Friction Rub] : no pericardial rub [Arterial Pulses Carotid] : carotid pulses were normal with no bruits [Edema] : there was no peripheral edema [Supraclavicular Lymph Nodes Enlarged Bilaterally] : supraclavicular [No CVA Tenderness] : no ~M costovertebral angle tenderness [No Spinal Tenderness] : no spinal tenderness [] : no rash [Oriented To Time, Place, And Person] : oriented to person, place, and time [Impaired Insight] : insight and judgment were intact [Affect] : the affect was normal

## 2021-03-05 NOTE — ASSESSMENT
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 nephrocalcinosis noted on prior renal sonogram (2018)- creatinine has been stable\par - HTN - BP at goal- c/w present regimen\par - CKD 2- creat 1.25 - stable; electrolytes WNL\par probable IgA nephropathy,  defer kidney biopsy unless rising creat or worsening proteinuria\par - proteinuria- for repeat Uprot/creat - c/w ARB\par - hematuria- (smoker) to repeat Ucytology- should have cystoscopy at one point- asked him to d/w his \par - DM-  A1C  very good- 5.7%; c/w metformin 500 mg 2 tabs daily\par - smoking cessation provided- again emphasized that it is very important that he stops smoking for overall health and well being\par - kidney stones last 24 hour urine for litholink analysis was 11/2018- repeat later this year \par - prior hypercalcemia with Ca 10.6- normalized now 10.0- off Vit d and calcium supplements  \par  \par follow up in 4-6 months\par \par

## 2021-03-05 NOTE — HISTORY OF PRESENT ILLNESS
[FreeTextEntry1] : 47 yo man here for follow up evaluation of HTN, CKD 2, hematuria and proteinuria, DMT2, kidney stones. 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

## 2021-04-21 ENCOUNTER — RX RENEWAL (OUTPATIENT)
Age: 46
End: 2021-04-21

## 2021-05-10 ENCOUNTER — RX RENEWAL (OUTPATIENT)
Age: 46
End: 2021-05-10

## 2021-05-19 ENCOUNTER — APPOINTMENT (OUTPATIENT)
Dept: ENDOCRINOLOGY | Facility: CLINIC | Age: 46
End: 2021-05-19
Payer: MEDICAID

## 2021-05-19 VITALS
WEIGHT: 166 LBS | SYSTOLIC BLOOD PRESSURE: 117 MMHG | BODY MASS INDEX: 23.15 KG/M2 | HEART RATE: 89 BPM | DIASTOLIC BLOOD PRESSURE: 64 MMHG

## 2021-05-19 PROCEDURE — 99214 OFFICE O/P EST MOD 30 MIN: CPT

## 2021-05-19 RX ORDER — DULAGLUTIDE 1.5 MG/.5ML
1.5 INJECTION, SOLUTION SUBCUTANEOUS
Qty: 2 | Refills: 5 | Status: DISCONTINUED | COMMUNITY
Start: 2018-03-14 | End: 2021-05-19

## 2021-05-19 NOTE — PHYSICAL EXAM
[Alert] : alert [Healthy Appearance] : healthy appearance [Normal Hearing] : hearing was normal [No LAD] : no lymphadenopathy [Thyroid Not Enlarged] : the thyroid was not enlarged [Clear to Auscultation] : lungs were clear to auscultation bilaterally [Normal S1, S2] : normal S1 and S2 [Regular Rhythm] : with a regular rhythm [No Edema] : no peripheral edema [Pedal Pulses Normal] : the pedal pulses are present [Normal Sensation on Monofilament Testing] : normal sensation on monofilament testing of lower extremities [Normal Affect] : the affect was normal [Normal Mood] : the mood was normal [Acanthosis Nigricans] : no acanthosis nigricans [Foot Ulcers] : no foot ulcers [de-identified] : no onychomycoses

## 2021-05-19 NOTE — ASSESSMENT
[FreeTextEntry1] : Diabetes, A1c well below goal.   Proteinuria, on ARB.  \par will discontinue Trulicity since A1c is still < 6.0%, continue on metformin only\par He may be also able to come off fibrate now since A1c well controlled, and trigs have been < 100 for last two readings. He will discuss with PCP.\par continue statin \par RTO 6 months

## 2021-05-19 NOTE — DATA REVIEWED
[FreeTextEntry1] : 2/21: A1c 5.7%, tot chol 151, HDL 42, LDL 88, trig 116, 25D 48, Cr 1.25\par 11/20: A1c 5.9%, tot chol 136, HDL 46, trig 97, LDL 72, Cr 1.26, TSH 1.51\par 5/20: A1c 5.9%, tot chol 125, HDL 43,trig 114, LDL 62, Cr 1.22\par 2/20: A1c 6.8%, tot chol 162, trig 124, HDL 42, LDL 97, Cr 1.27\par 11/19: A1c 6.8%,  tot chol 170, HDL 40, , trig 151, TSH 1.04, urine microalbumin/cr 415, 25D 30\par 5/19: A1c 6.9%, Cr 1.17, tot chol 188, HDL 44, , trig 161, urine microalbumin/cr 374, Ca 10.5\par 5/19: Ca 10.4, PTH 11, Cr 1.19, 1,25D 44, 25D 31\par 10/18: A1c 7.0%, tot chol 154, HDL 35, LDL 92, trig 177, urine microalb/cr 241, Cr 1.37\par 6/18:  A1c 7.5%, Cr 1.24, tot chol 164, HDL 30, LDL 88, trig 342, urine microalb/cr 437\par 3/18: A1c 6.9%, tot chol 139, trig 151, HDL 30, LDL 84, Cr 1.15, K 5.7, urine microalb/cr 165\par 12/17: A1c 6.8%, tot chol 160, trig 231, HDL 30, LDL 95, urine microalb/cr 268\par 10/17: TSH 1.78, C peptide 3.83, prolactin 2.8, 25D 39, ICA neg, [place] 65 neg\par 9/17: A1c 7.2%, urine microalb/cr 97, tot chol 205, HDL 30, LDl 132, trig 277\par 6/17: A1c 8.4%,  tot chol 153, trig 212, HDL 32, LDL 79\par 3/17: tot chol 156, HDL 28, LDL 76, trig 258, urine microalb/cr 148, HIV neg

## 2021-06-07 ENCOUNTER — RX RENEWAL (OUTPATIENT)
Age: 46
End: 2021-06-07

## 2021-08-27 ENCOUNTER — APPOINTMENT (OUTPATIENT)
Dept: GASTROENTEROLOGY | Facility: CLINIC | Age: 46
End: 2021-08-27
Payer: MEDICAID

## 2021-08-27 ENCOUNTER — NON-APPOINTMENT (OUTPATIENT)
Age: 46
End: 2021-08-27

## 2021-08-27 VITALS
TEMPERATURE: 95.1 F | BODY MASS INDEX: 23.8 KG/M2 | SYSTOLIC BLOOD PRESSURE: 120 MMHG | DIASTOLIC BLOOD PRESSURE: 88 MMHG | HEIGHT: 71 IN | RESPIRATION RATE: 14 BRPM | OXYGEN SATURATION: 97 % | WEIGHT: 170 LBS | HEART RATE: 88 BPM

## 2021-08-27 DIAGNOSIS — R10.11 RIGHT UPPER QUADRANT PAIN: ICD-10-CM

## 2021-08-27 DIAGNOSIS — K80.20 CALCULUS OF GALLBLADDER W/OUT CHOLECYSTITIS W/OUT OBSTRUCTION: ICD-10-CM

## 2021-08-27 PROCEDURE — 99203 OFFICE O/P NEW LOW 30 MIN: CPT

## 2021-08-27 RX ORDER — LANCETS
EACH MISCELLANEOUS
Qty: 100 | Refills: 1 | Status: DISCONTINUED | COMMUNITY
Start: 2020-11-17 | End: 2021-08-27

## 2021-08-27 RX ORDER — MONTELUKAST 10 MG/1
10 TABLET, FILM COATED ORAL DAILY
Qty: 30 | Refills: 0 | Status: DISCONTINUED | COMMUNITY
Start: 2017-09-28 | End: 2021-08-27

## 2021-08-27 RX ORDER — MUPIROCIN 20 MG/G
2 OINTMENT TOPICAL
Qty: 22 | Refills: 0 | Status: DISCONTINUED | COMMUNITY
Start: 2017-06-22 | End: 2021-08-27

## 2021-08-27 RX ORDER — TRIAMCINOLONE ACETONIDE 55 UG/1
55 SOLUTION/ DROPS OPHTHALMIC
Qty: 17 | Refills: 0 | Status: DISCONTINUED | COMMUNITY
Start: 2018-06-07 | End: 2021-08-27

## 2021-08-27 RX ORDER — PSEUDOEPHEDRINE HYDROCHLORIDE 120 MG/1
120 TABLET ORAL
Qty: 30 | Refills: 0 | Status: DISCONTINUED | COMMUNITY
Start: 2017-06-09 | End: 2021-08-27

## 2021-08-27 RX ORDER — BLOOD-GLUCOSE METER
70 EACH MISCELLANEOUS
Qty: 1 | Refills: 5 | Status: DISCONTINUED | COMMUNITY
Start: 2018-11-28 | End: 2021-08-27

## 2021-08-27 NOTE — ASSESSMENT
[FreeTextEntry1] : 47 yo male with cholelithiasis, episode of RUQ paion; also a hx of colon polyps\par \par - Will refer for a surgical consultation for a cholecystectomy\par - Will arrange a colonoscopy for hx of colon polyps -- pt [place] a downtown location so would like to have it at [facility] when available\par - D/w pt regarding need for COVID vaccination documentation for the procedure as well as escort post-procedure\par - Risks of the procedure including bleeding, perforation, etc d/w the patient\par - Golytely split prep\par

## 2021-08-27 NOTE — PHYSICAL EXAM
[General Appearance - Alert] : alert [General Appearance - Well Nourished] : well nourished [General Appearance - Well-Appearing] : healthy appearing [Sclera] : the sclera and conjunctiva were normal [Outer Ear] : the ears and nose were normal in appearance [Neck Appearance] : the appearance of the neck was normal [Abdomen Soft] : soft [] : no respiratory distress [Abdomen Tenderness] : non-tender [Abdomen Mass (___ Cm)] : no abdominal mass palpated [Involuntary Movements] : no involuntary movements were seen [Skin Color & Pigmentation] : normal skin color and pigmentation [No Focal Deficits] : no focal deficits [Oriented To Time, Place, And Person] : oriented to person, place, and time

## 2021-08-27 NOTE — HISTORY OF PRESENT ILLNESS
[FreeTextEntry1] : 45 yo male here for evaluation of RUQ pain.  Pt with RUQ for two weeks in , constant, crampy sharp pain, 7/10.  Unchanged with meals.  No pain since.  No heartburn or reflux.  Lost weight after starting DM meds, no change in appetite.  No N/V/D, no constipation.  No BRBPR, no dark stools.  \par \par 21 abdominal US showed areas of fatty infiltration and sparing of the liver, cholelithiasis, and a possible nonobstructing LT renal calculus.\par \par 21 and 21 LFTs were normal.  \par \par Colonoscopy > 10 years ago -- Dr. Bradly Eduardo -- benign polyp found\par EGD > 10 years ago -- Dr. 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

## 2021-09-13 ENCOUNTER — APPOINTMENT (OUTPATIENT)
Dept: NEPHROLOGY | Facility: CLINIC | Age: 46
End: 2021-09-13
Payer: MEDICAID

## 2021-09-13 VITALS — HEART RATE: 82 BPM | DIASTOLIC BLOOD PRESSURE: 70 MMHG | SYSTOLIC BLOOD PRESSURE: 120 MMHG

## 2021-09-13 PROCEDURE — 99214 OFFICE O/P EST MOD 30 MIN: CPT

## 2021-09-16 NOTE — ASSESSMENT
[FreeTextEntry1] : all lab data was reviewed with patient in detail from 6/23/2021\par 45 yo man with CKD 2, hematuria, proteinuria; HTN, DMT2, kidney stones; \par - HTN - BP at goal- c/w present regimen\par - CKD 2- creat 1.53- uptick- ? dehydration- will repeat 2 weeks\par - - stable; electrolytes WNL; probable IgA nephropathy,  defer kidney biopsy unless rising creat or worsening proteinuria\par - proteinuria- for repeat Uprot/creat - c/w ARB\par - hematuria- (smoker)- did have cysto- NAP\par - DM- controlled; c/w metformin 500 mg 2 tabs daily\par - kidney stones last 24 hour urine for litholink analysis was 11/2018- instructed to repeat 24U collection/litholink before next OV\par - hypercalcemia- increase again to 11.2- needs repeat with PTH; not using Vit D or Ca  \par  \par follow up in 4 months\par will discuss repeat ca and PTH when avilable\par \par

## 2021-09-16 NOTE — HISTORY OF PRESENT ILLNESS
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

## 2021-11-16 ENCOUNTER — APPOINTMENT (OUTPATIENT)
Dept: ENDOCRINOLOGY | Facility: CLINIC | Age: 46
End: 2021-11-16
Payer: MEDICAID

## 2021-11-16 VITALS
WEIGHT: 175 LBS | HEART RATE: 102 BPM | BODY MASS INDEX: 24.41 KG/M2 | DIASTOLIC BLOOD PRESSURE: 69 MMHG | SYSTOLIC BLOOD PRESSURE: 134 MMHG

## 2021-11-16 PROCEDURE — 99214 OFFICE O/P EST MOD 30 MIN: CPT

## 2021-11-16 NOTE — HISTORY OF PRESENT ILLNESS
[FreeTextEntry1] : no health issues since last visit\par stopped Trulicity after last visit because A1c was < 6% but since then, A1c increasing\par am glucose log reviewed: mostly in 120-150 range.\par He is also less active, vaccination hubs are less busy\par had abdominal pain (1 episode), saw GI, may go for colonoscopy but right now being monitored\par will be due to annual ophtho visit in Dec.\par no chest pain, SOB or neuropathy symptoms\par labs from 9/21 reviewed:  A1c 6.9%\par received Covid booster and flu vaccine\par \par no FH of diabetes or kidney disease\par \par Meds:\par metformin 500mg BID(diarrhea on the 850 mg dose)\par Trulicity 1.5mg/week -- stopped last visit\par Ventolin prn, Symbicort 80/4.5, montelukast 10mg\par K citrate, losartan 50mg\par simvastatin 20mg, fenofibrate 160mg\par Allegra prn, hydroxyzine prn\par Truvada Prep\par zolpidem 10mg prn\par Nuvigial 150mg/day\par Mvi, B12, coQ10, psyllium, fish oil\par Previous meds: Jardiance (changed to Steglatro), Steglatro (didn't need)

## 2021-11-16 NOTE — DATA REVIEWED
[FreeTextEntry1] : 9/21 A1c 6.9%, tot chol 185, HDL 43, trig 146, , GFR 59\par 5/21: A1c 6.2% tot chol 155, HDL 42, trig 86, LDL 95\par 2/21: A1c 5.7%, tot chol 151, HDL 42, LDL 88, trig 116, 25D 48, Cr 1.25\par 11/20: A1c 5.9%, tot chol 136, HDL 46, trig 97, LDL 72, Cr 1.26, TSH 1.51\par 5/20: A1c 5.9%, tot chol 125, HDL 43,trig 114, LDL 62, Cr 1.22\par 2/20: A1c 6.8%, tot chol 162, trig 124, HDL 42, LDL 97, Cr 1.27\par 11/19: A1c 6.8%,  tot chol 170, HDL 40, , trig 151, TSH 1.04, urine microalbumin/cr 415, 25D 30\par 5/19: A1c 6.9%, Cr 1.17, tot chol 188, HDL 44, , trig 161, urine microalbumin/cr 374, Ca 10.5\par 5/19: Ca 10.4, PTH 11, Cr 1.19, 1,25D 44, 25D 31\par 10/18: A1c 7.0%, tot chol 154, HDL 35, LDL 92, trig 177, urine microalb/cr 241, Cr 1.37\par 6/18:  A1c 7.5%, Cr 1.24, tot chol 164, HDL 30, LDL 88, trig 342, urine microalb/cr 437\par 3/18: A1c 6.9%, tot chol 139, trig 151, HDL 30, LDL 84, Cr 1.15, K 5.7, urine microalb/cr 165\par 12/17: A1c 6.8%, tot chol 160, trig 231, HDL 30, LDL 95, urine microalb/cr 268\par 10/17: TSH 1.78, C peptide 3.83, prolactin 2.8, 25D 39, ICA neg, [place] 65 neg\par 9/17: A1c 7.2%, urine microalb/cr 97, tot chol 205, HDL 30, LDl 132, trig 277\par 6/17: A1c 8.4%,  tot chol 153, trig 212, HDL 32, LDL 79\par 3/17: tot chol 156, HDL 28, LDL 76, trig 258, urine microalb/cr 148, HIV neg

## 2021-11-16 NOTE — ASSESSMENT
[FreeTextEntry1] : Diabetes, at goal (goal A1c < 7.0.%) but A1c increasing.   Proteinuria, on ARB.  \par Restart Trulicity at lower dose, 0.75mg/week.  continue Jardiance and metformin\par Recommended increasing physical activity; goal exercise 30 min/day, 5x/week. suggested walking outside.\par continue statin and fibrate for lipids\par RTO 6 months

## 2021-11-16 NOTE — PHYSICAL EXAM
[Alert] : alert [Healthy Appearance] : healthy appearance [Normal Hearing] : hearing was normal [No LAD] : no lymphadenopathy [Thyroid Not Enlarged] : the thyroid was not enlarged [Clear to Auscultation] : lungs were clear to auscultation bilaterally [Normal S1, S2] : normal S1 and S2 [Regular Rhythm] : with a regular rhythm [No Edema] : no peripheral edema [Pedal Pulses Normal] : the pedal pulses are present [Acanthosis Nigricans] : no acanthosis nigricans [Foot Ulcers] : no foot ulcers [Normal Sensation on Monofilament Testing] : normal sensation on monofilament testing of lower extremities [Normal Affect] : the affect was normal [Normal Mood] : the mood was normal [de-identified] : no onychomycoses

## 2022-01-10 ENCOUNTER — APPOINTMENT (OUTPATIENT)
Dept: NEPHROLOGY | Facility: CLINIC | Age: 47
End: 2022-01-10

## 2022-02-07 ENCOUNTER — APPOINTMENT (OUTPATIENT)
Dept: NEPHROLOGY | Facility: CLINIC | Age: 47
End: 2022-02-07
Payer: MEDICAID

## 2022-02-07 VITALS — OXYGEN SATURATION: 99 % | DIASTOLIC BLOOD PRESSURE: 70 MMHG | HEART RATE: 88 BPM | SYSTOLIC BLOOD PRESSURE: 118 MMHG

## 2022-02-07 PROCEDURE — 99214 OFFICE O/P EST MOD 30 MIN: CPT

## 2022-02-07 RX ORDER — POLYVINYL ALCOHOL 1.4 %
1.4 DROPS OPHTHALMIC (EYE)
Qty: 15 | Refills: 0 | Status: DISCONTINUED | COMMUNITY
Start: 2022-02-02

## 2022-02-07 RX ORDER — TADALAFIL 20 MG/1
20 TABLET ORAL
Qty: 4 | Refills: 0 | Status: DISCONTINUED | COMMUNITY
Start: 2021-10-25

## 2022-02-07 NOTE — PHYSICAL EXAM
[General Appearance - Alert] : alert [General Appearance - In No Acute Distress] : in no acute distress [] : no respiratory distress [Auscultation Breath Sounds / Voice Sounds] : lungs were clear to auscultation bilaterally [Heart Rate And Rhythm] : heart rate was normal and rhythm regular [Heart Sounds] : normal S1 and S2 [Heart Sounds Gallop] : no gallops [Murmurs] : no murmurs [Heart Sounds Pericardial Friction Rub] : no pericardial rub [Arterial Pulses Carotid] : carotid pulses were normal with no bruits [Edema] : there was no peripheral edema [No CVA Tenderness] : no ~M costovertebral angle tenderness [No Spinal Tenderness] : no spinal tenderness [Oriented To Time, Place, And Person] : oriented to person, place, and time [Impaired Insight] : insight and judgment were intact [Affect] : the affect was normal

## 2022-02-09 NOTE — ASSESSMENT
[FreeTextEntry1] : all lab data was reviewed with patient in detail from 12/6/2021 and litholink report from 11/11/2021\par 46 yo man with CKD 2, hematuria, proteinuria; HTN, DMT2, kidney stones; \par - HTN - BP controlled  c/w present regimen\par - CKD 2- creat 1.58-- stable at this level past few determinations- ? BP too low- monitor\par Na, K, CO2 good; instructed to avoid NSAIDs\par probable IgA nephropathy,  defer kidney biopsy for now\par -hypercalcemia- to repeat; measure ACE and Vit D levels- to d/w Dr. Keith\par - proteinuria- repeat Uprot/creat - c/w ARB\par - hematuria- (smoker)- did have cysto- NAP\par - DM- controlled A1C 7.1%- on trulicity again- for endo f/u\par - kidney stones-  litholink excellent Uvol; Ucal too high, but hypercalcemia (with low PTH) defer HCTZ use- \par Uoxalate up- reviewed low oxalate diet; also need low purine diet. \par Ucitrate 717- okay- but consider addition of potassium citrate to optimize given high SS Ca Ox\par - hypercalcemia- increase again to 11.2- needs repeat with PTH; not using Vit D or Ca  \par -active smoker- counseled to dc smoking\par  \par follow up in 4 months\par \par \par

## 2022-02-09 NOTE — HISTORY OF PRESENT ILLNESS
[FreeTextEntry1] : 45 yo man here for f/u evaluation of HTN, CKD 2, hematuria and proteinuria, DMT2, kidney stones.\par reports that he is back on trulicity- smaller dosage than prior (dc'd as A1C was too low, now rising)\par is using advil 2 daily for 2-3 days for LBP- not too frequently- but did use 3 days last week for LBP\par no episodes of renal colic\par from his patient portal on phone showed that PTH 11 on 9/21/2021 and 6 on 6/29/2021 and 11 on 5/13/2019\par 1 aleve prn HA infrequent < 1-2 x monthly\par + active smoker\par on truvada for PreP\par denies N, V, F, C\par denies flank pain, dysuria, hematuria or frothy urine \par \par \par PMH:\par creat runs about 1.2 - 1.4 with eGFR 62-70, remains stable\par 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

## 2022-02-22 NOTE — REASON FOR VISIT
Patient initially sent to the hospital from nsgy clinic for infected hardware.  Of note history of IVDU and TLIF of L3-L5 on 04/16/2021 for previous spinal abscess. This hospital admission MRI C/T/L spine showed suspected discitis/osteomyelitis at L3-4 and L4-5 with probable hardware infection. Afebrile, white count 3.31 at time of admission to the ICU.     Continue vancomycin and ceftriaxone  Washout and hardware removal with neurosurgery 2/21  Estimated 1L blood loss intraop, required 1L PRBCs intraoperatively  Monitor drain output  ID following  Restarting heparin 2/22 per nsgy recs  Plan for post op xray 2/22       [Follow-Up: _____] : a [unfilled] follow-up visit

## 2022-05-18 ENCOUNTER — APPOINTMENT (OUTPATIENT)
Dept: ENDOCRINOLOGY | Facility: CLINIC | Age: 47
End: 2022-05-18
Payer: MEDICAID

## 2022-05-18 VITALS
DIASTOLIC BLOOD PRESSURE: 80 MMHG | HEIGHT: 71 IN | BODY MASS INDEX: 24.08 KG/M2 | SYSTOLIC BLOOD PRESSURE: 126 MMHG | WEIGHT: 172 LBS | HEART RATE: 114 BPM

## 2022-05-18 LAB — GLUCOSE BLDC GLUCOMTR-MCNC: 285

## 2022-05-18 PROCEDURE — 82962 GLUCOSE BLOOD TEST: CPT

## 2022-05-18 PROCEDURE — 99214 OFFICE O/P EST MOD 30 MIN: CPT | Mod: 25

## 2022-05-18 NOTE — PHYSICAL EXAM
[Alert] : alert [Healthy Appearance] : healthy appearance [Normal Hearing] : hearing was normal [No LAD] : no lymphadenopathy [Thyroid Not Enlarged] : the thyroid was not enlarged [Clear to Auscultation] : lungs were clear to auscultation bilaterally [Normal S1, S2] : normal S1 and S2 [Regular Rhythm] : with a regular rhythm [No Edema] : no peripheral edema [Pedal Pulses Normal] : the pedal pulses are present [Acanthosis Nigricans] : no acanthosis nigricans [Foot Ulcers] : no foot ulcers [Normal Sensation on Monofilament Testing] : normal sensation on monofilament testing of lower extremities [Normal Affect] : the affect was normal [Normal Mood] : the mood was normal [de-identified] : no onychomycoses

## 2022-05-18 NOTE — DATA REVIEWED
[FreeTextEntry1] : 3/22: A1c 7.3%, urine alb/cr 410, Ca 10.4, 25D 45\par 12/21: A1c 7.1%, tot chol 169, HDL 40, trig 121, , Ca 11.2, Cr 1.58, GFR 51, TSH 2.27\par 9/21 A1c 6.9%, tot chol 185, HDL 43, trig 146, , GFR 59\par 5/21: A1c 6.2% tot chol 155, HDL 42, trig 86, LDL 95\par 2/21: A1c 5.7%, tot chol 151, HDL 42, LDL 88, trig 116, 25D 48, Cr 1.25\par 11/20: A1c 5.9%, tot chol 136, HDL 46, trig 97, LDL 72, Cr 1.26, TSH 1.51\par 5/20: A1c 5.9%, tot chol 125, HDL 43,trig 114, LDL 62, Cr 1.22\par 2/20: A1c 6.8%, tot chol 162, trig 124, HDL 42, LDL 97, Cr 1.27\par 11/19: A1c 6.8%,  tot chol 170, HDL 40, , trig 151, TSH 1.04, urine microalbumin/cr 415, 25D 30\par 5/19: A1c 6.9%, Cr 1.17, tot chol 188, HDL 44, , trig 161, urine microalbumin/cr 374, Ca 10.5\par 5/19: Ca 10.4, PTH 11, Cr 1.19, 1,25D 44, 25D 31\par 10/18: A1c 7.0%, tot chol 154, HDL 35, LDL 92, trig 177, urine microalb/cr 241, Cr 1.37\par 6/18:  A1c 7.5%, Cr 1.24, tot chol 164, HDL 30, LDL 88, trig 342, urine microalb/cr 437\par 3/18: A1c 6.9%, tot chol 139, trig 151, HDL 30, LDL 84, Cr 1.15, K 5.7, urine microalb/cr 165\par 12/17: A1c 6.8%, tot chol 160, trig 231, HDL 30, LDL 95, urine microalb/cr 268\par 10/17: TSH 1.78, C peptide 3.83, prolactin 2.8, 25D 39, ICA neg, [place] 65 neg\par 9/17: A1c 7.2%, urine microalb/cr 97, tot chol 205, HDL 30, LDl 132, trig 277\par 6/17: A1c 8.4%,  tot chol 153, trig 212, HDL 32, LDL 79\par 3/17: tot chol 156, HDL 28, LDL 76, trig 258, urine microalb/cr 148, HIV neg

## 2022-05-18 NOTE — ASSESSMENT
[FreeTextEntry1] : Diabetes, A1c increasing.   Proteinuria, on ARB.  \par Increase Trulicity back to 1.5mg/week. continue metformin 500mg BID dosing.\par If sugars not improving after 2 months, advised pt to contact me and we can titrate Trulicity to 3mg/week\par encouraged increased physical activity\par continue statin and fibrate for lipids\par RTO 6 months

## 2022-05-18 NOTE — HISTORY OF PRESENT ILLNESS
[FreeTextEntry1] : Sugars going higher.   Testing in the am:  range 140-199\par gets dry mouth when sugars are high.  no blurred vision or polyuria\par occasionally has tingling in toes or fingers when sugars are high\par He increased metformin to TID but sugars are still high (not lower).\par Doesn't feel as full as he used to/feels more hungry again\par no chest pain, SOB \par labs from 12/21 and 3/22 reviewed:  A1c 7.1, 7.3%\par received Covid booster and flu vaccine\par \par no FH of diabetes or kidney disease\par \par Meds:\par metformin 500mg TID(diarrhea on the 850 mg dose)\par Trulicity 0.75mg/week \par Ventolin prn, Symbicort 80/4.5, montelukast 10mg, Xolair\par K citrate, losartan 50mg\par simvastatin 20mg, fenofibrate 160mg\par Allegra prn, hydroxyzine prn\par Truvada Prep\par zolpidem 10mg prn\par Nuvigial 150mg/day\par Mvi, B12, coQ10, psyllium, fish oil\par Previous meds: Jardiance (changed to Steglatro), Steglatro (didn't need)

## 2022-08-01 ENCOUNTER — APPOINTMENT (OUTPATIENT)
Dept: NEPHROLOGY | Facility: CLINIC | Age: 47
End: 2022-08-01

## 2022-08-16 ENCOUNTER — TRANSCRIPTION ENCOUNTER (OUTPATIENT)
Age: 47
End: 2022-08-16

## 2022-09-08 ENCOUNTER — TRANSCRIPTION ENCOUNTER (OUTPATIENT)
Age: 47
End: 2022-09-08

## 2022-11-07 ENCOUNTER — RX RENEWAL (OUTPATIENT)
Age: 47
End: 2022-11-07

## 2022-11-16 ENCOUNTER — APPOINTMENT (OUTPATIENT)
Dept: ENDOCRINOLOGY | Facility: CLINIC | Age: 47
End: 2022-11-16

## 2022-11-16 VITALS
SYSTOLIC BLOOD PRESSURE: 110 MMHG | WEIGHT: 168 LBS | HEART RATE: 103 BPM | DIASTOLIC BLOOD PRESSURE: 71 MMHG | BODY MASS INDEX: 23.43 KG/M2

## 2022-11-16 LAB — GLUCOSE BLDC GLUCOMTR-MCNC: 189

## 2022-11-16 PROCEDURE — 99214 OFFICE O/P EST MOD 30 MIN: CPT | Mod: 25

## 2022-11-16 PROCEDURE — 82962 GLUCOSE BLOOD TEST: CPT

## 2022-11-16 NOTE — PHYSICAL EXAM
[Alert] : alert [Healthy Appearance] : healthy appearance [No Proptosis] : no proptosis [No Lid Lag] : no lid lag [Normal Hearing] : hearing was normal [No LAD] : no lymphadenopathy [Thyroid Not Enlarged] : the thyroid was not enlarged [Clear to Auscultation] : lungs were clear to auscultation bilaterally [Normal S1, S2] : normal S1 and S2 [Regular Rhythm] : with a regular rhythm [No Edema] : no peripheral edema [Pedal Pulses Normal] : the pedal pulses are present [Normal Sensation on Monofilament Testing] : normal sensation on monofilament testing of lower extremities [Normal Affect] : the affect was normal [Normal Mood] : the mood was normal [Acanthosis Nigricans] : no acanthosis nigricans [Foot Ulcers] : no foot ulcers [de-identified] : no onychomycoses

## 2022-11-16 NOTE — DATA REVIEWED
[FreeTextEntry1] : 9/22 A1c 7.6%\par 6/22 A1c 7.8%\par 3/22: A1c 7.3%, urine alb/cr 410, Ca 10.4, 25D 45\par 12/21: A1c 7.1%, tot chol 169, HDL 40, trig 121, , Ca 11.2, Cr 1.58, GFR 51, TSH 2.27\par 9/21 A1c 6.9%, tot chol 185, HDL 43, trig 146, , GFR 59\par 5/21: A1c 6.2% tot chol 155, HDL 42, trig 86, LDL 95\par 2/21: A1c 5.7%, tot chol 151, HDL 42, LDL 88, trig 116, 25D 48, Cr 1.25\par 11/20: A1c 5.9%, tot chol 136, HDL 46, trig 97, LDL 72, Cr 1.26, TSH 1.51\par 5/20: A1c 5.9%, tot chol 125, HDL 43,trig 114, LDL 62, Cr 1.22\par 2/20: A1c 6.8%, tot chol 162, trig 124, HDL 42, LDL 97, Cr 1.27\par 11/19: A1c 6.8%,  tot chol 170, HDL 40, , trig 151, TSH 1.04, urine microalbumin/cr 415, 25D 30\par 5/19: A1c 6.9%, Cr 1.17, tot chol 188, HDL 44, , trig 161, urine microalbumin/cr 374, Ca 10.5\par 5/19: Ca 10.4, PTH 11, Cr 1.19, 1,25D 44, 25D 31\par 10/18: A1c 7.0%, tot chol 154, HDL 35, LDL 92, trig 177, urine microalb/cr 241, Cr 1.37\par 6/18:  A1c 7.5%, Cr 1.24, tot chol 164, HDL 30, LDL 88, trig 342, urine microalb/cr 437\par 3/18: A1c 6.9%, tot chol 139, trig 151, HDL 30, LDL 84, Cr 1.15, K 5.7, urine microalb/cr 165\par 12/17: A1c 6.8%, tot chol 160, trig 231, HDL 30, LDL 95, urine microalb/cr 268\par 10/17: TSH 1.78, C peptide 3.83, prolactin 2.8, 25D 39, ICA neg, [place] 65 neg\par 9/17: A1c 7.2%, urine microalb/cr 97, tot chol 205, HDL 30, LDl 132, trig 277\par 6/17: A1c 8.4%,  tot chol 153, trig 212, HDL 32, LDL 79\par 3/17: tot chol 156, HDL 28, LDL 76, trig 258, urine microalb/cr 148, HIV neg

## 2022-11-16 NOTE — ASSESSMENT
[FreeTextEntry1] : Diabetes, suboptimal  (goal A1c < 7.0.%)   Proteinuria, on ARB.  \par will restart Jardiance.   samples for 10mg tabs given (#14) and then titrate to 25mg (rx sent)\par continue Trulicity and metformin\par continue statin  and fibrate therapy\par RTO 6 months

## 2022-11-16 NOTE — HISTORY OF PRESENT ILLNESS
[FreeTextEntry1] : Glucose log reviewed;  range 130-180.  Contour meter:  14d avg 151 (n12). This range is lower than last time A1c is higher\par there has been no change in diet or physical activity.  He has been feeling full more quickly on Trulicity.\par no polyuria, polydipsia, SOB, chest pain, or neuropathy symptoms \par saw podiatry in Sept.  will see ophtho next month.\par labs from 6/22 and 9/22 reviewed:  A1c 7.8, 7.6%\par received Covid booster and flu vaccine\par \par no FH of diabetes or kidney disease\par \par Meds:\par metformin 500mg TID(diarrhea on the 850 mg dose)\par Trulicity 0.75mg/week \par Ventolin prn, Symbicort 80/4.5, montelukast 10mg, Xolair\par K citrate, losartan 50mg\par simvastatin 20mg, fenofibrate 160mg\par Allegra prn, hydroxyzine prn\par Truvada Prep\par zolpidem 10mg prn\par Nuvigial 150mg/day\par Mvi, B12, coQ10, psyllium, fish oil\par Previous meds: Jardiance (changed to Steglatro), Steglatro (didn't need)

## 2022-11-17 ENCOUNTER — APPOINTMENT (OUTPATIENT)
Dept: NEPHROLOGY | Facility: CLINIC | Age: 47
End: 2022-11-17

## 2022-11-17 ENCOUNTER — RX RENEWAL (OUTPATIENT)
Age: 47
End: 2022-11-17

## 2022-11-17 VITALS — HEART RATE: 93 BPM | DIASTOLIC BLOOD PRESSURE: 72 MMHG | SYSTOLIC BLOOD PRESSURE: 116 MMHG

## 2022-11-17 DIAGNOSIS — E83.52 HYPERCALCEMIA: ICD-10-CM

## 2022-11-17 PROCEDURE — 99214 OFFICE O/P EST MOD 30 MIN: CPT

## 2022-11-17 NOTE — HISTORY OF PRESENT ILLNESS
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

## 2022-11-17 NOTE — ASSESSMENT
[FreeTextEntry1] : all lab data was reviewed with patient in detail from 9/9/2022\par 48 yo man with CKD 2, hematuria, proteinuria; HTN, DMT2, kidney stones; \par - HTN - BP controlled  c/w present regimen\par - CKD 2- creat stabilized at this mid 1 level, 1.56\par maintaining low protein diet;  avoiding NSAIDs\par probable IgA nephropathy,  defer kidney biopsy for now\par -hyperkalemia- K  5.1- high normal- okay- limiting intake of k rich foods\par -hypercalcemia-  ca 10.2- acceptable. \par - proteinuria- c/w ARB and agree with addition of sglt2i; repeat Uprot/creat - pt prefers to leave urine sample with PCP next month- okay- will have forward to this office\par - hematuria- (smoker)- did have cysto- NAP\par - DM- controlled A1C 7.7%- for endo f/u\par - kidney stones- no recurrence- \par drinking a lot of water, dilute urine\par latest litholink is 11/2021: hyperoxaluria, hypercalciuria, ucitrate 717 good\par oxalate- has reduce oxalate consumption\par repeat Spring 2023 (18 months after prior)\par -active smoker- counseled to dc smoking\par  \par f/u 6 months\par \par \par

## 2023-03-13 ENCOUNTER — TRANSCRIPTION ENCOUNTER (OUTPATIENT)
Age: 48
End: 2023-03-13

## 2023-04-05 ENCOUNTER — APPOINTMENT (OUTPATIENT)
Dept: GASTROENTEROLOGY | Facility: CLINIC | Age: 48
End: 2023-04-05
Payer: MEDICAID

## 2023-04-05 ENCOUNTER — RX RENEWAL (OUTPATIENT)
Age: 48
End: 2023-04-05

## 2023-04-05 VITALS
RESPIRATION RATE: 16 BRPM | SYSTOLIC BLOOD PRESSURE: 114 MMHG | BODY MASS INDEX: 23.24 KG/M2 | HEIGHT: 71 IN | WEIGHT: 166 LBS | DIASTOLIC BLOOD PRESSURE: 72 MMHG | HEART RATE: 105 BPM | OXYGEN SATURATION: 98 % | TEMPERATURE: 97.3 F

## 2023-04-05 DIAGNOSIS — E78.5 HYPERLIPIDEMIA, UNSPECIFIED: ICD-10-CM

## 2023-04-05 DIAGNOSIS — Z86.69 PERSONAL HISTORY OF OTHER DISEASES OF THE NERVOUS SYSTEM AND SENSE ORGANS: ICD-10-CM

## 2023-04-05 DIAGNOSIS — Z86.010 PERSONAL HISTORY OF COLONIC POLYPS: ICD-10-CM

## 2023-04-05 DIAGNOSIS — Z87.09 PERSONAL HISTORY OF OTHER DISEASES OF THE RESPIRATORY SYSTEM: ICD-10-CM

## 2023-04-05 DIAGNOSIS — I10 ESSENTIAL (PRIMARY) HYPERTENSION: ICD-10-CM

## 2023-04-05 DIAGNOSIS — Z86.39 PERSONAL HISTORY OF OTHER ENDOCRINE, NUTRITIONAL AND METABOLIC DISEASE: ICD-10-CM

## 2023-04-05 PROCEDURE — 99214 OFFICE O/P EST MOD 30 MIN: CPT

## 2023-04-05 RX ORDER — EMPAGLIFLOZIN 10 MG/1
10 TABLET, FILM COATED ORAL DAILY
Qty: 77 | Refills: 0 | Status: DISCONTINUED | COMMUNITY
Start: 2022-11-16 | End: 2023-04-05

## 2023-04-05 RX ORDER — PSYLLIUM HUSK 0.4 G
CAPSULE ORAL
Refills: 0 | Status: ACTIVE | COMMUNITY

## 2023-04-05 RX ORDER — POLYETHYLENE GLYCOL 3350 AND ELECTROLYTES WITH LEMON FLAVOR 236; 22.74; 6.74; 5.86; 2.97 G/4L; G/4L; G/4L; G/4L; G/4L
236 POWDER, FOR SOLUTION ORAL
Qty: 1 | Refills: 0 | Status: DISCONTINUED | COMMUNITY
Start: 2021-08-27 | End: 2023-04-05

## 2023-04-05 RX ORDER — FLUTICASONE PROPIONATE 50 UG/1
50 SPRAY, METERED NASAL
Qty: 16 | Refills: 0 | Status: DISCONTINUED | COMMUNITY
Start: 2022-09-15 | End: 2023-04-05

## 2023-04-05 RX ORDER — BLOOD-GLUCOSE METER
70 EACH MISCELLANEOUS
Qty: 1 | Refills: 5 | Status: DISCONTINUED | COMMUNITY
Start: 2022-05-18 | End: 2023-04-05

## 2023-04-05 NOTE — ASSESSMENT
[FreeTextEntry1] : 47 yo male with a hx of a colon polyp here to arrange a surveillance colonoscopy. \par \par - Colonoscopy at [facility]\par - D/w pt regarding escort post-procedure\par - Risks of the procedure including bleeding, perforation, etc d/w the patient\par - GoLYTELY split prep\par - Hold metformin day prior to colonoscopy\par - Hold steglatro three days prior to colonoscopy\par - Hold Trulicity two days prior to colonoscopy

## 2023-04-05 NOTE — HISTORY OF PRESENT ILLNESS
[de-identified] : EGD > 10 years ago -- Dr. Bradly Eduardo -- normal [FreeTextEntry1] : Colonoscopy > 10 years ago -- Dr. Bradly Eduardo -- benign polyp found

## 2023-04-11 ENCOUNTER — TRANSCRIPTION ENCOUNTER (OUTPATIENT)
Age: 48
End: 2023-04-11

## 2023-04-24 ENCOUNTER — RX RENEWAL (OUTPATIENT)
Age: 48
End: 2023-04-24

## 2023-04-24 RX ORDER — ISOPROPYL ALCOHOL 70 ML/100ML
70 SWAB TOPICAL
Qty: 100 | Refills: 5 | Status: ACTIVE | COMMUNITY
Start: 2022-11-07 | End: 1900-01-01

## 2023-05-24 ENCOUNTER — APPOINTMENT (OUTPATIENT)
Dept: ENDOCRINOLOGY | Facility: CLINIC | Age: 48
End: 2023-05-24
Payer: MEDICAID

## 2023-05-24 VITALS
BODY MASS INDEX: 23.15 KG/M2 | HEART RATE: 99 BPM | SYSTOLIC BLOOD PRESSURE: 106 MMHG | DIASTOLIC BLOOD PRESSURE: 70 MMHG | WEIGHT: 166 LBS

## 2023-05-24 LAB
GLUCOSE BLDC GLUCOMTR-MCNC: 306
HBA1C MFR BLD HPLC: 8

## 2023-05-24 PROCEDURE — 82962 GLUCOSE BLOOD TEST: CPT

## 2023-05-24 PROCEDURE — 99214 OFFICE O/P EST MOD 30 MIN: CPT | Mod: 25

## 2023-05-24 PROCEDURE — 83036 HEMOGLOBIN GLYCOSYLATED A1C: CPT | Mod: QW

## 2023-05-24 RX ORDER — ERTUGLIFLOZIN 15 MG/1
15 TABLET, FILM COATED ORAL
Qty: 30 | Refills: 5 | Status: DISCONTINUED | COMMUNITY
Start: 2020-02-13 | End: 2023-05-24

## 2023-05-24 RX ORDER — ERTUGLIFLOZIN 15 MG/1
TABLET, FILM COATED ORAL
Refills: 0 | Status: DISCONTINUED | COMMUNITY
End: 2023-05-24

## 2023-05-24 NOTE — DATA REVIEWED
[FreeTextEntry1] : 5/23  A1c 8.0% POC\par 3/23  tot chol 144, HDL 40, trig 115, LDL 83, TSH 1.16, 25D 34, GFR 71\par 9/22 A1c 7.6%\par 6/22 A1c 7.8%\par 3/22: A1c 7.3%, urine alb/cr 410, Ca 10.4, 25D 45\par 12/21: A1c 7.1%, tot chol 169, HDL 40, trig 121, , Ca 11.2, Cr 1.58, GFR 51, TSH 2.27\par 9/21 A1c 6.9%, tot chol 185, HDL 43, trig 146, , GFR 59\par 5/21: A1c 6.2% tot chol 155, HDL 42, trig 86, LDL 95\par 2/21: A1c 5.7%, tot chol 151, HDL 42, LDL 88, trig 116, 25D 48, Cr 1.25\par 11/20: A1c 5.9%, tot chol 136, HDL 46, trig 97, LDL 72, Cr 1.26, TSH 1.51\par 5/20: A1c 5.9%, tot chol 125, HDL 43,trig 114, LDL 62, Cr 1.22\par 2/20: A1c 6.8%, tot chol 162, trig 124, HDL 42, LDL 97, Cr 1.27\par 11/19: A1c 6.8%,  tot chol 170, HDL 40, , trig 151, TSH 1.04, urine microalbumin/cr 415, 25D 30\par 5/19: A1c 6.9%, Cr 1.17, tot chol 188, HDL 44, , trig 161, urine microalbumin/cr 374, Ca 10.5\par 5/19: Ca 10.4, PTH 11, Cr 1.19, 1,25D 44, 25D 31\par 10/18: A1c 7.0%, tot chol 154, HDL 35, LDL 92, trig 177, urine microalb/cr 241, Cr 1.37\par 6/18:  A1c 7.5%, Cr 1.24, tot chol 164, HDL 30, LDL 88, trig 342, urine microalb/cr 437\par 3/18: A1c 6.9%, tot chol 139, trig 151, HDL 30, LDL 84, Cr 1.15, K 5.7, urine microalb/cr 165\par 12/17: A1c 6.8%, tot chol 160, trig 231, HDL 30, LDL 95, urine microalb/cr 268\par 10/17: TSH 1.78, C peptide 3.83, prolactin 2.8, 25D 39, ICA neg, [place] 65 neg\par 9/17: A1c 7.2%, urine microalb/cr 97, tot chol 205, HDL 30, LDl 132, trig 277\par 6/17: A1c 8.4%,  tot chol 153, trig 212, HDL 32, LDL 79\par 3/17: tot chol 156, HDL 28, LDL 76, trig 258, urine microalb/cr 148, HIV neg

## 2023-05-24 NOTE — ASSESSMENT
[FreeTextEntry1] : Diabetes, suboptimal and A1c is increasing  (goal A1c < 7.0.%) Proteinuria, on ARB. \par Increasing sugars likely due to progression of diabetes, diminishing insulin/beta cell reserve.\par Higher sugars likely contributing to vision changes and progression of cataracts.\par Titrate Trulicity to 3mg/week;  if not available will change to Ozempic 1mg\par Change to Jardiance 25mg, samples given #14\par continue metformin 500mg tid\par Advised to reduce carbs in diet, avoid using added creamers that contain sugar, and try increasing exercise to be more consistent\par Agree with holding meds prior to colonoscopy and he can restart once he has resumed po intake.\par continue statin and fibrate therapy\par RTO 4 months. \par \par

## 2023-05-24 NOTE — HISTORY OF PRESENT ILLNESS
[FreeTextEntry1] : Glucose log reviewed;  range 140-180.  Sugars have been higher and vision is blurry, contact lens rx keeps changing.\par Eating has been the same.  exercise is on and off.\par now, glucose is 306 and he ate roast pork bun for lunch, drank coffee with creamer\par no  neuropathy symptoms \par up to date with ophtho. saw in 12/22, cataracts are progressing\par labs from 3/23  reviewed:  LDL 83.  A1c today is 8.0%\par He is scheduled for colonoscopy at end of June, was advised to hold Trulicity 3 days before, SGLT2 inhibitor 2 days before, and metformin the day before.\par Steglatro requires prior auth but he was told Jardiance is covered.\par \par no FH of diabetes or kidney disease\par \par Meds:\par metformin 500mg TID(diarrhea on the 850 mg dose)\par Trulicity 1.5mg/week, Steglatro 15mg\par Ventolin prn, Symbicort 80/4.5, montelukast 10mg, Xolair\par K citrate, losartan 50mg\par simvastatin 20mg, fenofibrate 160mg\par Allegra prn, hydroxyzine prn\par Truvada Prep\par zolpidem 10mg prn\par Nuvigial 150mg/day\par Mvi, B12, coQ10, psyllium, fish oil\par Previous meds: Jardiance (changed to Steglatro), Steglatro (didn't need)

## 2023-05-25 ENCOUNTER — TRANSCRIPTION ENCOUNTER (OUTPATIENT)
Age: 48
End: 2023-05-25

## 2023-06-26 ENCOUNTER — RX RENEWAL (OUTPATIENT)
Age: 48
End: 2023-06-26

## 2023-06-27 ENCOUNTER — APPOINTMENT (OUTPATIENT)
Age: 48
End: 2023-06-27
Payer: MEDICAID

## 2023-06-27 ENCOUNTER — RESULT REVIEW (OUTPATIENT)
Age: 48
End: 2023-06-27

## 2023-06-27 PROCEDURE — 45385 COLONOSCOPY W/LESION REMOVAL: CPT

## 2023-06-27 PROCEDURE — 45380 COLONOSCOPY AND BIOPSY: CPT | Mod: 59

## 2023-08-04 ENCOUNTER — TRANSCRIPTION ENCOUNTER (OUTPATIENT)
Age: 48
End: 2023-08-04

## 2023-08-07 ENCOUNTER — APPOINTMENT (OUTPATIENT)
Dept: NEPHROLOGY | Facility: CLINIC | Age: 48
End: 2023-08-07

## 2023-08-07 ENCOUNTER — APPOINTMENT (OUTPATIENT)
Dept: NEPHROLOGY | Facility: CLINIC | Age: 48
End: 2023-08-07
Payer: MEDICAID

## 2023-08-07 PROCEDURE — 99442: CPT

## 2023-08-07 RX ORDER — BETAMETHASONE VALERATE 1 MG/G
0.1 CREAM TOPICAL
Qty: 15 | Refills: 0 | Status: ACTIVE | COMMUNITY
Start: 2023-07-06

## 2023-08-08 ENCOUNTER — TRANSCRIPTION ENCOUNTER (OUTPATIENT)
Age: 48
End: 2023-08-08

## 2023-08-09 NOTE — REASON FOR VISIT
[Home] : at home, [unfilled] , at the time of the visit. [Verbal consent obtained from patient] : the patient, [unfilled] [Follow-Up] : a follow-up visit [FreeTextEntry1] : for CKD hematuria and proteinuria

## 2023-08-09 NOTE — ASSESSMENT
[FreeTextEntry1] : all lab data was reviewed with patient in detail from 3/2023 and 6/6/2023 49 yo man with CKD 2, hematuria, proteinuria; HTN, DMT2, kidney stones;  - HTN - BP running on low side lately-  c/w losartan and jardiance for proteinuria. - CKD 2- creat  stable latest 1.27;  range max 1.56  maintaining low protein diet;  avoiding NSAIDs IgA nephropathy, versus diabetic nephropathy (or both)- optimize A1c. defer kidney biopsy  -hyperkalemia- K  5.5-  discussed need to limit intake of K rich foods or add potassium lowering agent -hypercalcemia-  ca normalized- monitor - proteinuria- Uprot/creat 1.7 and 2.1 last 2 determinations- on RASi and sglt2i - hematuria- (smoker)- did have cysto- NAP- will repeat - may need CT scan- active smoker. - DM- controlled A1C 8.1%- recently had increase of trulicity. for endo f/u - kidney stones- no recurrence-  drinking a lot of water, dilute urine latest litholink is 11/2021: hyperoxaluria, hypercalciuria, ucitrate 717; maintains low  oxalate consumption no repeat yet-  -active smoker- counseled to dc smoking   has appt. set for Sept-

## 2023-08-09 NOTE — HISTORY OF PRESENT ILLNESS
[FreeTextEntry1] : 49 yo man for follow up evaluation of HTN, CKD 2, hematuria and proteinuria, DMT2, kidney stones. 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

## 2023-08-15 ENCOUNTER — TRANSCRIPTION ENCOUNTER (OUTPATIENT)
Age: 48
End: 2023-08-15

## 2023-08-18 ENCOUNTER — TRANSCRIPTION ENCOUNTER (OUTPATIENT)
Age: 48
End: 2023-08-18

## 2023-08-21 ENCOUNTER — TRANSCRIPTION ENCOUNTER (OUTPATIENT)
Age: 48
End: 2023-08-21

## 2023-08-21 DIAGNOSIS — K62.5 HEMORRHAGE OF ANUS AND RECTUM: ICD-10-CM

## 2023-09-01 ENCOUNTER — TRANSCRIPTION ENCOUNTER (OUTPATIENT)
Age: 48
End: 2023-09-01

## 2023-09-13 ENCOUNTER — TRANSCRIPTION ENCOUNTER (OUTPATIENT)
Age: 48
End: 2023-09-13

## 2023-09-13 RX ORDER — METFORMIN HYDROCHLORIDE 500 MG/1
500 TABLET, COATED ORAL
Qty: 270 | Refills: 1 | Status: ACTIVE | COMMUNITY
Start: 2018-03-14 | End: 1900-01-01

## 2023-09-20 ENCOUNTER — APPOINTMENT (OUTPATIENT)
Dept: NEPHROLOGY | Facility: CLINIC | Age: 48
End: 2023-09-20
Payer: MEDICAID

## 2023-09-20 VITALS — DIASTOLIC BLOOD PRESSURE: 80 MMHG | SYSTOLIC BLOOD PRESSURE: 114 MMHG | HEART RATE: 86 BPM

## 2023-09-20 PROCEDURE — 99214 OFFICE O/P EST MOD 30 MIN: CPT

## 2023-09-27 ENCOUNTER — RESULT CHARGE (OUTPATIENT)
Age: 48
End: 2023-09-27

## 2023-09-27 ENCOUNTER — APPOINTMENT (OUTPATIENT)
Dept: ENDOCRINOLOGY | Facility: CLINIC | Age: 48
End: 2023-09-27
Payer: MEDICAID

## 2023-09-27 VITALS
SYSTOLIC BLOOD PRESSURE: 111 MMHG | WEIGHT: 159 LBS | BODY MASS INDEX: 22.18 KG/M2 | DIASTOLIC BLOOD PRESSURE: 67 MMHG | HEART RATE: 99 BPM

## 2023-09-27 LAB
GLUCOSE BLDC GLUCOMTR-MCNC: 173
HBA1C MFR BLD HPLC: 6.8

## 2023-09-27 PROCEDURE — 99214 OFFICE O/P EST MOD 30 MIN: CPT | Mod: 25

## 2023-09-27 PROCEDURE — 82962 GLUCOSE BLOOD TEST: CPT

## 2023-09-27 PROCEDURE — 83036 HEMOGLOBIN GLYCOSYLATED A1C: CPT | Mod: QW

## 2024-02-01 ENCOUNTER — APPOINTMENT (OUTPATIENT)
Dept: NEPHROLOGY | Facility: CLINIC | Age: 49
End: 2024-02-01
Payer: MEDICAID

## 2024-02-01 DIAGNOSIS — E11.9 TYPE 2 DIABETES MELLITUS W/OUT COMPLICATIONS: ICD-10-CM

## 2024-02-01 DIAGNOSIS — R31.29 OTHER MICROSCOPIC HEMATURIA: ICD-10-CM

## 2024-02-01 DIAGNOSIS — R80.9 PROTEINURIA, UNSPECIFIED: ICD-10-CM

## 2024-02-01 PROCEDURE — 99214 OFFICE O/P EST MOD 30 MIN: CPT

## 2024-02-01 PROCEDURE — G2211 COMPLEX E/M VISIT ADD ON: CPT | Mod: NC,1L

## 2024-02-01 RX ORDER — CETIRIZINE HCL 10 MG
TABLET ORAL
Refills: 0 | Status: DISCONTINUED | COMMUNITY
End: 2024-02-01

## 2024-02-01 RX ORDER — ARMODAFINIL 150 MG/1
150 TABLET ORAL
Qty: 30 | Refills: 0 | Status: DISCONTINUED | COMMUNITY
Start: 2017-01-03 | End: 2024-02-01

## 2024-02-01 RX ORDER — HYDROCORTISONE 25 MG/G
2.5 CREAM TOPICAL TWICE DAILY
Qty: 1 | Refills: 0 | Status: DISCONTINUED | COMMUNITY
Start: 2023-08-21 | End: 2024-02-01

## 2024-02-01 RX ORDER — ZOLPIDEM TARTRATE 10 MG/1
10 TABLET ORAL
Qty: 30 | Refills: 0 | Status: DISCONTINUED | COMMUNITY
Start: 2017-09-28 | End: 2024-02-01

## 2024-02-01 RX ORDER — TADALAFIL 20 MG/1
20 TABLET ORAL
Qty: 4 | Refills: 0 | Status: DISCONTINUED | COMMUNITY
Start: 2021-10-25 | End: 2024-02-01

## 2024-02-01 NOTE — HISTORY OF PRESENT ILLNESS
[FreeTextEntry1] : 48 yo man for follow up evaluation of HTN, CKD 2, hematuria and proteinuria, DMT2, kidney stones. since last OV here, had repeat sleep study- now off CPAP and off Nuvgil caused palpitation. sleeping 7+ hours, but does still feel tired during t eday at times- for sleep f/u s/p cystoscopy, L ureterosccopy, laserr lithotripsy of 12  mm kidney stone in Oct, 2023- stent in place for a week or so afterwards now fully recovered. Drinking  alot of water now is aware that he probably was not consuming as much water as ne needed to. 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

## 2024-02-01 NOTE — ASSESSMENT
[FreeTextEntry1] : all lab data was reviewed with patient in detail from 12/22/2023 50 yo man with CKD 2, hematuria, proteinuria; HTN, DMT2, kidney stones;  - HTN - BP stable- is taking losartan in evening consider that fatigue may be related to lowish BP- may try holding the meds for a day or so to see how he feels sleep f/u first c/w losartan and jardiance for proteinuria. - CKD 2- creat  1.35- stable range    max 1.56  maintaining low protein diet; avoiding NSAIDs IgA nephropathy, versus diabetic nephropathy (or both)- okay to defer kidney biopsy as would not  at this time -hyperkalemia- K 5.3- up trend- reviewed low k diet- re discussed potassium lowering agents if K rises further -hypercalcemia-  ca  10.5-  offers that he did take a MVI that had Ca and Vit d in it prior lab draw- followed by PCP - proteinuria-  will leave Usample today- prior trend: Uprot/creat 1.7 and 2.1 last 2 determinations c/w losartan 50 mg and Jardiance 25 mg daily - hematuria-  repet u/a no RBCs (after stone removed) - DM- controlled A1C 6.3%- good - kidney stones-  will have him collect 24H urine for litholink stone analysis, March or April, with f/u OV after that (by end April)  s/p removal of 12mm L ureteral stone with L hydro -hyperoxaluria-  re assess with 24H urine- trying to maintain low oxalate intake -active smoker- counseled to dc smoking   f/u 4 months

## 2024-02-04 LAB
APPEARANCE: CLEAR
BACTERIA: NEGATIVE /HPF
BILIRUBIN URINE: NEGATIVE
BLOOD URINE: NEGATIVE
CALCIUM OXALATE CRYSTALS: PRESENT
CAST: 0 /LPF
COLOR: NORMAL
CREAT SPEC-SCNC: 64 MG/DL
CREAT SPEC-SCNC: 64 MG/DL
CREAT/PROT UR: 1.2 RATIO
EPITHELIAL CELLS: 0 /HPF
GLUCOSE QUALITATIVE U: >=1000 MG/DL
KETONES URINE: NEGATIVE MG/DL
LEUKOCYTE ESTERASE URINE: NEGATIVE
MICROALBUMIN 24H UR DL<=1MG/L-MCNC: 20.8 MG/DL
MICROALBUMIN/CREAT 24H UR-RTO: 325 MG/G
MICROSCOPIC-UA: NORMAL
NITRITE URINE: NEGATIVE
PH URINE: 6.5
PROT UR-MCNC: 77 MG/DL
PROTEIN URINE: 100 MG/DL
RED BLOOD CELLS URINE: 2 /HPF
REVIEW: NORMAL
SPECIFIC GRAVITY URINE: >1.03
UROBILINOGEN URINE: 0.2 MG/DL
WHITE BLOOD CELLS URINE: 0 /HPF

## 2024-03-06 ENCOUNTER — TRANSCRIPTION ENCOUNTER (OUTPATIENT)
Age: 49
End: 2024-03-06

## 2024-03-11 ENCOUNTER — RX RENEWAL (OUTPATIENT)
Age: 49
End: 2024-03-11

## 2024-03-11 RX ORDER — DULAGLUTIDE 3 MG/.5ML
3 INJECTION, SOLUTION SUBCUTANEOUS
Qty: 2 | Refills: 5 | Status: ACTIVE | COMMUNITY
Start: 2021-11-16 | End: 1900-01-01

## 2024-03-12 ENCOUNTER — APPOINTMENT (OUTPATIENT)
Dept: ENDOCRINOLOGY | Facility: CLINIC | Age: 49
End: 2024-03-12
Payer: MEDICAID

## 2024-03-12 ENCOUNTER — TRANSCRIPTION ENCOUNTER (OUTPATIENT)
Age: 49
End: 2024-03-12

## 2024-03-12 VITALS
DIASTOLIC BLOOD PRESSURE: 70 MMHG | BODY MASS INDEX: 21.34 KG/M2 | SYSTOLIC BLOOD PRESSURE: 138 MMHG | HEART RATE: 70 BPM | WEIGHT: 153 LBS

## 2024-03-12 LAB
GLUCOSE BLDC GLUCOMTR-MCNC: 220
HBA1C MFR BLD HPLC: 6

## 2024-03-12 PROCEDURE — 82962 GLUCOSE BLOOD TEST: CPT

## 2024-03-12 PROCEDURE — 83036 HEMOGLOBIN GLYCOSYLATED A1C: CPT | Mod: QW

## 2024-03-12 PROCEDURE — G2211 COMPLEX E/M VISIT ADD ON: CPT | Mod: NC,1L

## 2024-03-12 PROCEDURE — 99214 OFFICE O/P EST MOD 30 MIN: CPT | Mod: 25

## 2024-03-12 NOTE — ASSESSMENT
[FreeTextEntry1] : Diabetes, controlled.  Proteinuria, on ARB.  Continue  Trulicity to 3mg/week,  Jardiance 25mg and metformin 500mg tid continue statin and fibrate therapy Discussed smoking cessation; I suggested weaning cigarette use to 5-10 cig/day and then setting quit date, and o the quit date, start nicotine patch. RTO 6 months.

## 2024-03-12 NOTE — HISTORY OF PRESENT ILLNESS
[FreeTextEntry1] : Glucose log reviewed;  range 118-170 Weight is down another 6 lb since last visit,, 13 lb from last year.  Weight has been stable since October, 150-153 lb. He saw a new sleep doctor and sleep study was repeated and he no longer needs to use CPAP. no  neuropathy symptoms  up to date with ophtho. saw in 12/23, cataract still small He is not exercising. Smoking 1 pack/day. labs from 3/24: A1c 6.1%, LDL 82, GFR 81  Meds: metformin 500mg TID (diarrhea on the 850 mg dose) Trulicity 3mg/week, Jardiance 25mg Ventolin prn, Symbicort 80/4.5, montelukast 10mg, Xolair K citrate, losartan 50mg simvastatin 20mg, fenofibrate 160mg Allegra prn, hydroxyzine prn Truvada Prep zolpidem 10mg prn Nuvigial 150mg/day Mvi, B12, coQ10, psyllium, fish oil Previous meds: Jardiance (changed to Steglatro), Steglatro (didn't need)

## 2024-03-12 NOTE — PHYSICAL EXAM
[Alert] : alert [No Acute Distress] : no acute distress [No Proptosis] : no proptosis [No Lid Lag] : no lid lag [Normal Hearing] : hearing was normal [No LAD] : no lymphadenopathy [Thyroid Not Enlarged] : the thyroid was not enlarged [Clear to Auscultation] : lungs were clear to auscultation bilaterally [Normal S1, S2] : normal S1 and S2 [Regular Rhythm] : with a regular rhythm [No Edema] : no peripheral edema [Pedal Pulses Normal] : the pedal pulses are present [Normal Sensation on Monofilament Testing] : normal sensation on monofilament testing of lower extremities [Normal Affect] : the affect was normal [Normal Mood] : the mood was normal [Acanthosis Nigricans] : no acanthosis nigricans [Foot Ulcers] : no foot ulcers [de-identified] : no onychomycoses

## 2024-03-12 NOTE — DATA REVIEWED
[FreeTextEntry1] : 3/24 A1c 6.0% tot chol 146, HDL 47, trig 89, LDL 82, GFR 81 2/24  urine alb/cr 325 12/23 A1c 6.3%,  tot chol 156, HDL 48, trig 108, LDL 88, TSH 1.86  9/23  A1c 7.0%, TSH 0.99, GFR 66 6/23  A1c 8.0%, TSH 1.71 3/23  tot chol 144, HDL 40, trig 115, LDL 83, TSH 1.16, 25D 34, GFR 71 9/22 A1c 7.6% 6/22 A1c 7.8% 3/22: A1c 7.3%, urine alb/cr 410, Ca 10.4, 25D 45 12/21: A1c 7.1%, tot chol 169, HDL 40, trig 121, , Ca 11.2, Cr 1.58, GFR 51, TSH 2.27 9/21 A1c 6.9%, tot chol 185, HDL 43, trig 146, , GFR 59 5/21: A1c 6.2% tot chol 155, HDL 42, trig 86, LDL 95 2/21: A1c 5.7%, tot chol 151, HDL 42, LDL 88, trig 116, 25D 48, Cr 1.25 11/20: A1c 5.9%, tot chol 136, HDL 46, trig 97, LDL 72, Cr 1.26, TSH 1.51 5/20: A1c 5.9%, tot chol 125, HDL 43,trig 114, LDL 62, Cr 1.22 2/20: A1c 6.8%, tot chol 162, trig 124, HDL 42, LDL 97, Cr 1.27 11/19: A1c 6.8%,  tot chol 170, HDL 40, , trig 151, TSH 1.04, urine microalbumin/cr 415, 25D 30 5/19: A1c 6.9%, Cr 1.17, tot chol 188, HDL 44, , trig 161, urine microalbumin/cr 374, Ca 10.5 5/19: Ca 10.4, PTH 11, Cr 1.19, 1,25D 44, 25D 31 10/18: A1c 7.0%, tot chol 154, HDL 35, LDL 92, trig 177, urine microalb/cr 241, Cr 1.37 6/18:  A1c 7.5%, Cr 1.24, tot chol 164, HDL 30, LDL 88, trig 342, urine microalb/cr 437 3/18: A1c 6.9%, tot chol 139, trig 151, HDL 30, LDL 84, Cr 1.15, K 5.7, urine microalb/cr 165 12/17: A1c 6.8%, tot chol 160, trig 231, HDL 30, LDL 95, urine microalb/cr 268 10/17: TSH 1.78, C peptide 3.83, prolactin 2.8, 25D 39, ICA neg, [place] 65 neg 9/17: A1c 7.2%, urine microalb/cr 97, tot chol 205, HDL 30, LDl 132, trig 277 6/17: A1c 8.4%,  tot chol 153, trig 212, HDL 32, LDL 79 3/17: tot chol 156, HDL 28, LDL 76, trig 258, urine microalb/cr 148, HIV neg

## 2024-03-18 ENCOUNTER — RX RENEWAL (OUTPATIENT)
Age: 49
End: 2024-03-18

## 2024-03-18 RX ORDER — EMPAGLIFLOZIN 25 MG/1
25 TABLET, FILM COATED ORAL
Qty: 30 | Refills: 5 | Status: ACTIVE | COMMUNITY
Start: 2023-05-24 | End: 1900-01-01

## 2024-06-06 ENCOUNTER — APPOINTMENT (OUTPATIENT)
Dept: NEPHROLOGY | Facility: CLINIC | Age: 49
End: 2024-06-06
Payer: MEDICAID

## 2024-06-06 DIAGNOSIS — N20.0 CALCULUS OF KIDNEY: ICD-10-CM

## 2024-06-06 DIAGNOSIS — E78.5 HYPERLIPIDEMIA, UNSPECIFIED: ICD-10-CM

## 2024-06-06 DIAGNOSIS — I10 ESSENTIAL (PRIMARY) HYPERTENSION: ICD-10-CM

## 2024-06-06 DIAGNOSIS — N18.2 CHRONIC KIDNEY DISEASE, STAGE 2 (MILD): ICD-10-CM

## 2024-06-06 DIAGNOSIS — F17.200 NICOTINE DEPENDENCE, UNSPECIFIED, UNCOMPLICATED: ICD-10-CM

## 2024-06-06 PROCEDURE — G2211 COMPLEX E/M VISIT ADD ON: CPT | Mod: NC

## 2024-06-06 PROCEDURE — 99214 OFFICE O/P EST MOD 30 MIN: CPT

## 2024-06-06 RX ORDER — POLYETHYLENE GLYCOL 3350 AND ELECTROLYTES WITH LEMON FLAVOR 236; 22.74; 6.74; 5.86; 2.97 G/4L; G/4L; G/4L; G/4L; G/4L
236 POWDER, FOR SOLUTION ORAL
Qty: 1 | Refills: 0 | Status: DISCONTINUED | COMMUNITY
Start: 2023-04-05 | End: 2024-06-06

## 2024-06-06 RX ORDER — LOSARTAN POTASSIUM 25 MG/1
25 TABLET, FILM COATED ORAL DAILY
Qty: 90 | Refills: 3 | Status: ACTIVE | COMMUNITY
Start: 1900-01-01 | End: 1900-01-01

## 2024-06-08 VITALS — SYSTOLIC BLOOD PRESSURE: 124 MMHG | DIASTOLIC BLOOD PRESSURE: 68 MMHG

## 2024-06-08 PROBLEM — E78.5 HYPERLIPIDEMIA: Status: ACTIVE | Noted: 2017-09-28

## 2024-06-08 PROBLEM — N18.2 CKD (CHRONIC KIDNEY DISEASE), STAGE II: Status: ACTIVE | Noted: 2018-11-12

## 2024-06-08 PROBLEM — N20.0 KIDNEY STONES: Status: ACTIVE | Noted: 2018-07-13

## 2024-06-08 PROBLEM — F17.200 NEEDS SMOKING CESSATION EDUCATION: Status: ACTIVE | Noted: 2018-07-13

## 2024-06-08 PROBLEM — I10 ESSENTIAL HYPERTENSION: Status: ACTIVE | Noted: 2017-09-28

## 2024-06-08 NOTE — ASSESSMENT
[FreeTextEntry1] : all lab data was reviewed with patient in detail from 5/21/2024 and 3/12/2024 50 yo man with CKD 2, hematuria, proteinuria; HTN, DMT2, kidney stones;  - kidney stones-   24H urine volume excellent 3.7L;   s/p removal of 12mm L ureteral stone with L hydro -hyperoxaluria-  98- much higher than prior with CaOx 8.75- detsiled reviewed of high oxalate foods- needs to reduce Also needs less purines/urate c/w citrate - proteinuria-   UPC 1.9 retry losartan 1/2 tab at bedtime- monitor BP c/w  Jardiance 25 mg daily - HTN - BP acceptable retry losartan  1/2 tab- monitor for symptoms of hypotension  trend proteinuria - CKD 2- creat  1.11- stable range  max 1.56  maintaining low protein diet; avoiding NSAIDs IgA nephropathy, versus diabetic nephropathy (or both)- defer kidney biopsy as would not  at this time -hyperkalemia- K better, 4.2-  -hypercalcemia-  ca  better 9.5  - hematuria-  repeat u/a no RBCs (after stone removed) - DM- A1C 6.1%- good -active smoker- counseled to dc smoking   f/u 4 months

## 2024-06-08 NOTE — HISTORY OF PRESENT ILLNESS
[FreeTextEntry1] : 48 yo man with HTN, CKD 2, hematuria and proteinuria, DMT2, kidney stones, for follow up evaluation. reviewed home BP readings all < 125/80 has been off losartan since 2/2024 with symptoms of hypotension. 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

## 2024-08-05 ENCOUNTER — TRANSCRIPTION ENCOUNTER (OUTPATIENT)
Age: 49
End: 2024-08-05

## 2024-08-06 ENCOUNTER — TRANSCRIPTION ENCOUNTER (OUTPATIENT)
Age: 49
End: 2024-08-06

## 2024-08-12 ENCOUNTER — TRANSCRIPTION ENCOUNTER (OUTPATIENT)
Age: 49
End: 2024-08-12

## 2024-08-13 ENCOUNTER — TRANSCRIPTION ENCOUNTER (OUTPATIENT)
Age: 49
End: 2024-08-13

## 2024-08-16 ENCOUNTER — TRANSCRIPTION ENCOUNTER (OUTPATIENT)
Age: 49
End: 2024-08-16

## 2024-08-19 ENCOUNTER — TRANSCRIPTION ENCOUNTER (OUTPATIENT)
Age: 49
End: 2024-08-19

## 2024-08-21 ENCOUNTER — APPOINTMENT (OUTPATIENT)
Dept: NEPHROLOGY | Facility: CLINIC | Age: 49
End: 2024-08-21
Payer: MEDICAID

## 2024-08-21 VITALS — SYSTOLIC BLOOD PRESSURE: 120 MMHG | HEART RATE: 72 BPM | DIASTOLIC BLOOD PRESSURE: 72 MMHG

## 2024-08-21 VITALS — HEART RATE: 72 BPM | SYSTOLIC BLOOD PRESSURE: 120 MMHG | DIASTOLIC BLOOD PRESSURE: 74 MMHG

## 2024-08-21 DIAGNOSIS — N18.2 CHRONIC KIDNEY DISEASE, STAGE 2 (MILD): ICD-10-CM

## 2024-08-21 DIAGNOSIS — R31.29 OTHER MICROSCOPIC HEMATURIA: ICD-10-CM

## 2024-08-21 DIAGNOSIS — R80.9 PROTEINURIA, UNSPECIFIED: ICD-10-CM

## 2024-08-21 DIAGNOSIS — E11.9 TYPE 2 DIABETES MELLITUS W/OUT COMPLICATIONS: ICD-10-CM

## 2024-08-21 PROCEDURE — 99214 OFFICE O/P EST MOD 30 MIN: CPT

## 2024-08-21 PROCEDURE — G2211 COMPLEX E/M VISIT ADD ON: CPT | Mod: NC

## 2024-08-21 RX ORDER — EMTRICITABINE AND TENOFOVIR ALAFENAMIDE 120; 15 MG/1; MG/1
120-15 TABLET ORAL
Refills: 0 | Status: ACTIVE | COMMUNITY

## 2024-08-21 NOTE — HISTORY OF PRESENT ILLNESS
[FreeTextEntry1] : 50 yo man here for f/u evaluation of HTN, CKD 2, hematuria and proteinuria, DMT2, kidney stones. reports that he was inpatient 7/30-8/2 for GI bleed- no source found - had EGD and colonoscopy for heme evaluation for concern for P Vera?  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

## 2024-08-21 NOTE — HISTORY OF PRESENT ILLNESS
[FreeTextEntry1] : 48 yo man here for f/u evaluation of HTN, CKD 2, hematuria and proteinuria, DMT2, kidney stones. reports that he was inpatient 7/30-8/2 for GI bleed- no source found - had EGD and colonoscopy for heme evaluation for concern for P Vera?  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

## 2024-08-21 NOTE — ASSESSMENT
[FreeTextEntry1] : all lab data was reviewed with patient in detail from EHR and his copies from e-chart on phone from hospitalization will forward hard copy. 50 yo man with recently hopsitlization with GI bleed episodes of symptomatic hypotension. CKD 2, hematuria, proteinuria; HTN, DMT2, kidney stones;   repeat UAC, u/a UPC today hold losartan-  repeat UAC,UPC 3-4 weeks- decide if need to restart RASi- may need to lower dosage c/e jardiance traveling to [facility] Oct-Nov- will defer any restart RASi until [place] given his h/o hypotension and near syncope  f/u appt in place for 9/23  - kidney stones-   24H urine volume excellent 3.7L;  s/p removal of 12mm L ureteral stone with L hydro -hyperoxaluria-reduced oxalate intake c/w citrate - CKD 2- creat stable-  range  max 1.56  maintaining low protein diet; avoiding NSAIDs IgA nephropathy, versus diabetic nephropathy (or both)- defer kidney biopsy as would not  at this time  DM- A1C 6.1%- good active smoker- counseled to dc smoking   f/u as above sept--

## 2024-08-21 NOTE — ASSESSMENT
[FreeTextEntry1] : all lab data was reviewed with patient in detail from EHR and his copies from e-chart on phone from hospitalization will forward hard copy. 50 yo man with recently hopsitlization with GI bleed episodes of symptomatic hypotension. CKD 2, hematuria, proteinuria; HTN, DMT2, kidney stones;   repeat UAC, u/a UPC today hold losartan-  repeat UAC,UPC 3-4 weeks- decide if need to restart RASi- may need to lower dosage c/e jardiance traveling to [facility] Oct-Nov- will defer any restart RASi until [place] from [place] given his h/o hypotension and near syncope  f/u appt in place for 9/23  - kidney stones-   24H urine volume excellent 3.7L;  s/p removal of 12mm L ureteral stone with L hydro -hyperoxaluria-reduced oxalate intake c/w citrate - CKD 2- creat stable-  range  max 1.56  maintaining low protein diet; avoiding NSAIDs IgA nephropathy, versus diabetic nephropathy (or both)- defer kidney biopsy as would not  at this time  DM- A1C 6.1%- good active smoker- counseled to dc smoking   f/u as above sept--

## 2024-08-21 NOTE — PHYSICAL EXAM
[General Appearance - Alert] : alert [General Appearance - In No Acute Distress] : in no acute distress [] : no respiratory distress [Auscultation Breath Sounds / Voice Sounds] : lungs were clear to auscultation bilaterally [Heart Rate And Rhythm] : heart rate was normal and rhythm regular [Heart Sounds] : normal S1 and S2 [Edema] : there was no peripheral edema [No CVA Tenderness] : no ~M costovertebral angle tenderness [Oriented To Time, Place, And Person] : oriented to person, place, and time [Affect] : the affect was normal [Impaired Insight] : insight and judgment were intact

## 2024-08-30 LAB
APPEARANCE: CLEAR
BACTERIA: NEGATIVE /HPF
BILIRUBIN URINE: NEGATIVE
BLOOD URINE: NEGATIVE
CAST: 0 /LPF
COLOR: YELLOW
CREAT SPEC-SCNC: 42 MG/DL
CREAT SPEC-SCNC: 42 MG/DL
CREAT/PROT UR: 0.7 RATIO
EPITHELIAL CELLS: 0 /HPF
GLUCOSE QUALITATIVE U: >=1000 MG/DL
KETONES URINE: NEGATIVE MG/DL
LEUKOCYTE ESTERASE URINE: NEGATIVE
MICROALBUMIN 24H UR DL<=1MG/L-MCNC: 6.7 MG/DL
MICROALBUMIN/CREAT 24H UR-RTO: 157 MG/G
MICROSCOPIC-UA: NORMAL
NITRITE URINE: NEGATIVE
PH URINE: 7
PROT UR-MCNC: 28 MG/DL
PROTEIN URINE: 30 MG/DL
RED BLOOD CELLS URINE: 1 /HPF
SPECIFIC GRAVITY URINE: 1.02
UROBILINOGEN URINE: 0.2 MG/DL
WHITE BLOOD CELLS URINE: 0 /HPF

## 2024-09-03 ENCOUNTER — RX RENEWAL (OUTPATIENT)
Age: 49
End: 2024-09-03

## 2024-09-10 ENCOUNTER — APPOINTMENT (OUTPATIENT)
Dept: ENDOCRINOLOGY | Facility: CLINIC | Age: 49
End: 2024-09-10
Payer: MEDICAID

## 2024-09-10 VITALS
WEIGHT: 153 LBS | HEART RATE: 103 BPM | BODY MASS INDEX: 21.42 KG/M2 | HEIGHT: 71 IN | DIASTOLIC BLOOD PRESSURE: 81 MMHG | SYSTOLIC BLOOD PRESSURE: 129 MMHG

## 2024-09-10 LAB
GLUCOSE BLDC GLUCOMTR-MCNC: 174
HBA1C MFR BLD HPLC: 6.5

## 2024-09-10 PROCEDURE — G2211 COMPLEX E/M VISIT ADD ON: CPT | Mod: NC

## 2024-09-10 PROCEDURE — 82962 GLUCOSE BLOOD TEST: CPT

## 2024-09-10 PROCEDURE — 99214 OFFICE O/P EST MOD 30 MIN: CPT | Mod: 25

## 2024-09-10 PROCEDURE — 83036 HEMOGLOBIN GLYCOSYLATED A1C: CPT | Mod: QW

## 2024-09-10 NOTE — DATA REVIEWED
[FreeTextEntry1] : 9/24  A1c 6.5%; A1c 6.1%,  25D 35,  TSH 0.93,  Hb 14.1 8/24  urine alb/cr 157 3/24 A1c 6.0% tot chol 146, HDL 47, trig 89, LDL 82, GFR 81 2/24  urine alb/cr 325 12/23 A1c 6.3%,  tot chol 156, HDL 48, trig 108, LDL 88, TSH 1.86  9/23  A1c 7.0%, TSH 0.99, GFR 66 6/23  A1c 8.0%, TSH 1.71 3/23  tot chol 144, HDL 40, trig 115, LDL 83, TSH 1.16, 25D 34, GFR 71 9/22 A1c 7.6% 6/22 A1c 7.8% 3/22: A1c 7.3%, urine alb/cr 410, Ca 10.4, 25D 45 12/21: A1c 7.1%, tot chol 169, HDL 40, trig 121, , Ca 11.2, Cr 1.58, GFR 51, TSH 2.27 9/21 A1c 6.9%, tot chol 185, HDL 43, trig 146, , GFR 59 5/21: A1c 6.2% tot chol 155, HDL 42, trig 86, LDL 95 2/21: A1c 5.7%, tot chol 151, HDL 42, LDL 88, trig 116, 25D 48, Cr 1.25 11/20: A1c 5.9%, tot chol 136, HDL 46, trig 97, LDL 72, Cr 1.26, TSH 1.51 5/20: A1c 5.9%, tot chol 125, HDL 43,trig 114, LDL 62, Cr 1.22 2/20: A1c 6.8%, tot chol 162, trig 124, HDL 42, LDL 97, Cr 1.27 11/19: A1c 6.8%,  tot chol 170, HDL 40, , trig 151, TSH 1.04, urine microalbumin/cr 415, 25D 30 5/19: A1c 6.9%, Cr 1.17, tot chol 188, HDL 44, , trig 161, urine microalbumin/cr 374, Ca 10.5 5/19: Ca 10.4, PTH 11, Cr 1.19, 1,25D 44, 25D 31 10/18: A1c 7.0%, tot chol 154, HDL 35, LDL 92, trig 177, urine microalb/cr 241, Cr 1.37 6/18:  A1c 7.5%, Cr 1.24, tot chol 164, HDL 30, LDL 88, trig 342, urine microalb/cr 437 3/18: A1c 6.9%, tot chol 139, trig 151, HDL 30, LDL 84, Cr 1.15, K 5.7, urine microalb/cr 165 12/17: A1c 6.8%, tot chol 160, trig 231, HDL 30, LDL 95, urine microalb/cr 268 10/17: TSH 1.78, C peptide 3.83, prolactin 2.8, 25D 39, ICA neg, [place] 65 neg 9/17: A1c 7.2%, urine microalb/cr 97, tot chol 205, HDL 30, LDl 132, trig 277 6/17: A1c 8.4%,  tot chol 153, trig 212, HDL 32, LDL 79 3/17: tot chol 156, HDL 28, LDL 76, trig 258, urine microalb/cr 148, HIV neg

## 2024-09-10 NOTE — HISTORY OF PRESENT ILLNESS
[FreeTextEntry1] : Glucose log reviewed;  range  .  He tests once/day in the month preceding appointment with me. Weight is stable. He was admitted last month for GI bleed, no cause found, maybe polycythemia vera or diverticulosis.    Benign polyp removed on colonoscopy.  Iron is still low, so he is scheduled for iron infusion this Friday.  He did not need transfusion during admission. no  neuropathy symptoms, chest pain or SOB up to date with ophtho. will be due in December He has appointment to see allergist because hives are recurring. labs from 9/24:  A1c 6.1%,  25D 35,  TSH 0.93,  Hb 14.1 A1c here is 6.5% He will get flu vaccine tomorrow with PCP.  Meds: metformin 500mg TID (diarrhea on the 850 mg dose) Trulicity 3mg/week, Jardiance 25mg Ventolin prn, Symbicort 80/4.5, montelukast 10mg, Xolair K citrate, losartan 25mg (on hold for now) simvastatin 20mg, fenofibrate 160mg Allegra prn, hydroxyzine prn Truvada Prep zolpidem 10mg prn Nuvigial 150mg/day Mvi, B12, coQ10, psyllium, fish oil Previous meds: Jardiance (changed to Steglatro), Steglatro (didn't need)

## 2024-09-10 NOTE — PHYSICAL EXAM
[Alert] : alert [No Acute Distress] : no acute distress [No Proptosis] : no proptosis [No Lid Lag] : no lid lag [Normal Hearing] : hearing was normal [No LAD] : no lymphadenopathy [Thyroid Not Enlarged] : the thyroid was not enlarged [Clear to Auscultation] : lungs were clear to auscultation bilaterally [Normal S1, S2] : normal S1 and S2 [Regular Rhythm] : with a regular rhythm [No Edema] : no peripheral edema [Pedal Pulses Normal] : the pedal pulses are present [Normal Sensation on Monofilament Testing] : normal sensation on monofilament testing of lower extremities [Normal Affect] : the affect was normal [Normal Mood] : the mood was normal [Acanthosis Nigricans] : no acanthosis nigricans [Foot Ulcers] : no foot ulcers [de-identified] : glucose 174,  cele 4h ago, 1 slice [de-identified] : reg tachy [de-identified] : no onychomycoses

## 2024-09-10 NOTE — ASSESSMENT
[FreeTextEntry1] : Diabetes, controlled.  Proteinuria, on ARB.  Continue  Trulicity to 3mg/week,  Jardiance 25mg and metformin 500mg tid continue statin and fibrate therapy RTO 6 months.

## 2024-09-18 ENCOUNTER — APPOINTMENT (OUTPATIENT)
Dept: GASTROENTEROLOGY | Facility: CLINIC | Age: 49
End: 2024-09-18

## 2024-09-18 VITALS
SYSTOLIC BLOOD PRESSURE: 148 MMHG | DIASTOLIC BLOOD PRESSURE: 87 MMHG | WEIGHT: 153 LBS | OXYGEN SATURATION: 98 % | HEART RATE: 102 BPM | BODY MASS INDEX: 21.42 KG/M2 | HEIGHT: 71 IN

## 2024-09-18 DIAGNOSIS — K62.5 HEMORRHAGE OF ANUS AND RECTUM: ICD-10-CM

## 2024-09-18 DIAGNOSIS — Z86.010 PERSONAL HISTORY OF COLONIC POLYPS: ICD-10-CM

## 2024-09-18 PROCEDURE — 99214 OFFICE O/P EST MOD 30 MIN: CPT

## 2024-09-23 ENCOUNTER — APPOINTMENT (OUTPATIENT)
Dept: NEPHROLOGY | Facility: CLINIC | Age: 49
End: 2024-09-23
Payer: MEDICAID

## 2024-09-23 VITALS — HEART RATE: 84 BPM | SYSTOLIC BLOOD PRESSURE: 118 MMHG | DIASTOLIC BLOOD PRESSURE: 68 MMHG

## 2024-09-23 DIAGNOSIS — E78.5 HYPERLIPIDEMIA, UNSPECIFIED: ICD-10-CM

## 2024-09-23 DIAGNOSIS — N18.2 CHRONIC KIDNEY DISEASE, STAGE 2 (MILD): ICD-10-CM

## 2024-09-23 DIAGNOSIS — R80.9 PROTEINURIA, UNSPECIFIED: ICD-10-CM

## 2024-09-23 DIAGNOSIS — N20.0 CALCULUS OF KIDNEY: ICD-10-CM

## 2024-09-23 DIAGNOSIS — E11.9 TYPE 2 DIABETES MELLITUS W/OUT COMPLICATIONS: ICD-10-CM

## 2024-09-23 DIAGNOSIS — R31.29 OTHER MICROSCOPIC HEMATURIA: ICD-10-CM

## 2024-09-23 DIAGNOSIS — I10 ESSENTIAL (PRIMARY) HYPERTENSION: ICD-10-CM

## 2024-09-23 PROCEDURE — 99214 OFFICE O/P EST MOD 30 MIN: CPT

## 2024-09-23 PROCEDURE — G2211 COMPLEX E/M VISIT ADD ON: CPT | Mod: NC

## 2024-09-23 RX ORDER — SUMATRIPTAN 50 MG/1
50 TABLET, FILM COATED ORAL
Refills: 0 | Status: ACTIVE | COMMUNITY

## 2024-09-23 RX ORDER — ATOGEPANT 60 MG/1
60 TABLET ORAL
Refills: 0 | Status: ACTIVE | COMMUNITY

## 2024-09-23 NOTE — ASSESSMENT
[FreeTextEntry1] : all lab data was reviewed with patient in detail from 9/4/2024 50 yo man CKD 2, hematuria, proteinuria; HTN, DMT2, kidney stones;  s/p GI bleed over the summer (7/30-8/2) -HTN- BP < 120- good- will continue to hold losartan- [place]6 [place] reassess proteinuria when returns- emphasized that ideally would like him on RASi; c/w jardiance - CKD 2- BUN/ creat 18/1.22-  stable-  range max 1.56  maintaining low protein diet; avoiding NSAIDs IgA nephropathy, versus diabetic nephropathy (or both)- defer kidney biopsy as would not  at this time -hyperkalemia K 5.4- monitor- advised to limit portion size of K rich foods -proteinuria- need to quantify again-   DM- A1C 6.1%- stable -GI bleed-  recovered hgb 14.1 and then s/p IVFe -active smoker- counseled to dc smoking - kidney stones-   24H urine volume excellent 3.7L;  s/p removal of 12mm L ureteral stone with L hydro -hyperoxaluria-reduced oxalate intake c/w citrate   f/u 3-4 months repat chems, PTH, UAC UPC

## 2024-09-23 NOTE — HISTORY OF PRESENT ILLNESS
[FreeTextEntry1] : 50 yo man with HTN, CKD 2, hematuria and proteinuria, DMT2, kidney stones, for follow up evaluation. last seen here 8/21/2024 when he had had a GI bleed and was also experiencing hypotension- dc'd losartan  today, reports that he feels well- no episodes of hypotension or dizziness. still off losartan. 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

## 2024-09-25 ENCOUNTER — TRANSCRIPTION ENCOUNTER (OUTPATIENT)
Age: 49
End: 2024-09-25

## 2024-09-26 ENCOUNTER — TRANSCRIPTION ENCOUNTER (OUTPATIENT)
Age: 49
End: 2024-09-26

## 2024-09-27 NOTE — HISTORY OF PRESENT ILLNESS
[de-identified] : 8/1/2024 Esophagus, stomach and duodenum were normal (bx --> mild chronic inactive gastritis) [FreeTextEntry1] : 7/31/2024 Normal terminal ileum, few medium mouthed diverticula in AC, 5 mm polyp in the DC removed with a snare (bx --> HP)   6/27/2023 BBPS of 9, 8 polyps removed, mild RT sided diverticulosis -- f/u in three years (1 TA, 2 HP, goblet cell hyperplasia, reactive lymphoid aggregate).   [de-identified] : 8/1/2024 patchy erythematous mucosa in antrum and prepyloric region, food residue in the antrum; capsule did not go to the duodenum after 16 hrs -- repeat study with 7 days off of GLP1

## 2024-09-27 NOTE — ASSESSMENT
[FreeTextEntry1] : 48 yo male here for f/u -- pt had 4 bouts of BRBPR over 1-2 hours (no precipitating factors) and decreased BP to 60's at the end of July -- hospitalized at [facility]  - Repeat colonoscopy in July 2028 - F/u with [facility] - F/u in office a year

## 2024-09-27 NOTE — HISTORY OF PRESENT ILLNESS
[de-identified] : 8/1/2024 Esophagus, stomach and duodenum were normal (bx --> mild chronic inactive gastritis) [FreeTextEntry1] : 7/31/2024 Normal terminal ileum, few medium mouthed diverticula in AC, 5 mm polyp in the DC removed with a snare (bx --> HP)   6/27/2023 BBPS of 9, 8 polyps removed, mild RT sided diverticulosis -- f/u in three years (1 TA, 2 HP, goblet cell hyperplasia, reactive lymphoid aggregate).   [de-identified] : 8/1/2024 patchy erythematous mucosa in antrum and prepyloric region, food residue in the antrum; capsule did not go to the duodenum after 16 hrs -- repeat study with 7 days off of GLP1

## 2024-09-27 NOTE — ASSESSMENT
[FreeTextEntry1] : 50 yo male here for f/u -- pt had 4 bouts of BRBPR over 1-2 hours (no precipitating factors) and decreased BP to 60's at the end of July -- hospitalized at [facility]  - Repeat colonoscopy in July 2028 - F/u with [facility] - F/u in office a year

## 2024-12-23 ENCOUNTER — TRANSCRIPTION ENCOUNTER (OUTPATIENT)
Age: 49
End: 2024-12-23

## 2025-02-03 ENCOUNTER — NON-APPOINTMENT (OUTPATIENT)
Age: 50
End: 2025-02-03

## 2025-02-03 ENCOUNTER — APPOINTMENT (OUTPATIENT)
Dept: NEPHROLOGY | Facility: CLINIC | Age: 50
End: 2025-02-03
Payer: MEDICAID

## 2025-02-03 VITALS — SYSTOLIC BLOOD PRESSURE: 128 MMHG | HEART RATE: 86 BPM | DIASTOLIC BLOOD PRESSURE: 74 MMHG

## 2025-02-03 DIAGNOSIS — I10 ESSENTIAL (PRIMARY) HYPERTENSION: ICD-10-CM

## 2025-02-03 DIAGNOSIS — R31.29 OTHER MICROSCOPIC HEMATURIA: ICD-10-CM

## 2025-02-03 DIAGNOSIS — R80.9 PROTEINURIA, UNSPECIFIED: ICD-10-CM

## 2025-02-03 DIAGNOSIS — N20.0 CALCULUS OF KIDNEY: ICD-10-CM

## 2025-02-03 DIAGNOSIS — E11.9 TYPE 2 DIABETES MELLITUS W/OUT COMPLICATIONS: ICD-10-CM

## 2025-02-03 DIAGNOSIS — N18.2 CHRONIC KIDNEY DISEASE, STAGE 2 (MILD): ICD-10-CM

## 2025-02-03 DIAGNOSIS — E78.5 HYPERLIPIDEMIA, UNSPECIFIED: ICD-10-CM

## 2025-02-03 DIAGNOSIS — E87.5 HYPERKALEMIA: ICD-10-CM

## 2025-02-03 DIAGNOSIS — E83.52 HYPERCALCEMIA: ICD-10-CM

## 2025-02-03 PROCEDURE — G2211 COMPLEX E/M VISIT ADD ON: CPT | Mod: NC

## 2025-02-03 PROCEDURE — 99214 OFFICE O/P EST MOD 30 MIN: CPT

## 2025-03-03 ENCOUNTER — APPOINTMENT (OUTPATIENT)
Dept: COLORECTAL SURGERY | Facility: CLINIC | Age: 50
End: 2025-03-03
Payer: MEDICAID

## 2025-03-03 VITALS
HEIGHT: 71 IN | DIASTOLIC BLOOD PRESSURE: 79 MMHG | WEIGHT: 146.06 LBS | BODY MASS INDEX: 20.45 KG/M2 | SYSTOLIC BLOOD PRESSURE: 114 MMHG

## 2025-03-03 DIAGNOSIS — K92.2 GASTROINTESTINAL HEMORRHAGE, UNSPECIFIED: ICD-10-CM

## 2025-03-03 DIAGNOSIS — K64.9 UNSPECIFIED HEMORRHOIDS: ICD-10-CM

## 2025-03-03 PROCEDURE — 99203 OFFICE O/P NEW LOW 30 MIN: CPT | Mod: 25

## 2025-03-03 PROCEDURE — 46600 DIAGNOSTIC ANOSCOPY SPX: CPT

## 2025-05-07 ENCOUNTER — TRANSCRIPTION ENCOUNTER (OUTPATIENT)
Age: 50
End: 2025-05-07

## 2025-05-07 RX ORDER — BLOOD SUGAR DIAGNOSTIC
STRIP MISCELLANEOUS
Qty: 50 | Refills: 5 | Status: ACTIVE | COMMUNITY
Start: 2025-05-07 | End: 1900-01-01

## 2025-05-07 RX ORDER — BLOOD-GLUCOSE METER
W/DEVICE KIT MISCELLANEOUS
Qty: 1 | Refills: 0 | Status: ACTIVE | COMMUNITY
Start: 2025-05-07 | End: 1900-01-01

## 2025-05-07 RX ORDER — LANCETS 32 GAUGE
EACH MISCELLANEOUS
Qty: 100 | Refills: 1 | Status: ACTIVE | COMMUNITY
Start: 2025-05-07 | End: 1900-01-01

## 2025-05-12 ENCOUNTER — APPOINTMENT (OUTPATIENT)
Dept: NEPHROLOGY | Facility: CLINIC | Age: 50
End: 2025-05-12
Payer: MEDICAID

## 2025-05-12 VITALS — SYSTOLIC BLOOD PRESSURE: 120 MMHG | DIASTOLIC BLOOD PRESSURE: 70 MMHG | HEART RATE: 78 BPM

## 2025-05-12 DIAGNOSIS — E83.52 HYPERCALCEMIA: ICD-10-CM

## 2025-05-12 DIAGNOSIS — I10 ESSENTIAL (PRIMARY) HYPERTENSION: ICD-10-CM

## 2025-05-12 DIAGNOSIS — R80.9 PROTEINURIA, UNSPECIFIED: ICD-10-CM

## 2025-05-12 DIAGNOSIS — N18.2 CHRONIC KIDNEY DISEASE, STAGE 2 (MILD): ICD-10-CM

## 2025-05-12 DIAGNOSIS — E11.9 TYPE 2 DIABETES MELLITUS W/OUT COMPLICATIONS: ICD-10-CM

## 2025-05-12 PROCEDURE — G2211 COMPLEX E/M VISIT ADD ON: CPT | Mod: NC

## 2025-05-12 PROCEDURE — 99214 OFFICE O/P EST MOD 30 MIN: CPT

## 2025-05-22 ENCOUNTER — TRANSCRIPTION ENCOUNTER (OUTPATIENT)
Age: 50
End: 2025-05-22

## 2025-06-03 ENCOUNTER — APPOINTMENT (OUTPATIENT)
Dept: ENDOCRINOLOGY | Facility: CLINIC | Age: 50
End: 2025-06-03
Payer: MEDICAID

## 2025-06-03 VITALS
HEART RATE: 101 BPM | WEIGHT: 146 LBS | SYSTOLIC BLOOD PRESSURE: 106 MMHG | DIASTOLIC BLOOD PRESSURE: 72 MMHG | BODY MASS INDEX: 20.36 KG/M2

## 2025-06-03 DIAGNOSIS — E11.9 TYPE 2 DIABETES MELLITUS W/OUT COMPLICATIONS: ICD-10-CM

## 2025-06-03 LAB
GLUCOSE BLDC GLUCOMTR-MCNC: 129
HBA1C MFR BLD HPLC: 6.5

## 2025-06-03 PROCEDURE — 83036 HEMOGLOBIN GLYCOSYLATED A1C: CPT | Mod: QW

## 2025-06-03 PROCEDURE — 82962 GLUCOSE BLOOD TEST: CPT

## 2025-06-03 PROCEDURE — 99214 OFFICE O/P EST MOD 30 MIN: CPT | Mod: 25

## 2025-06-03 RX ORDER — DULAGLUTIDE 3 MG/.5ML
3 INJECTION, SOLUTION SUBCUTANEOUS
Qty: 12 | Refills: 3 | Status: ACTIVE | COMMUNITY
Start: 2025-06-03 | End: 1900-01-01

## 2025-07-08 ENCOUNTER — TRANSCRIPTION ENCOUNTER (OUTPATIENT)
Age: 50
End: 2025-07-08

## 2025-09-10 ENCOUNTER — NON-APPOINTMENT (OUTPATIENT)
Age: 50
End: 2025-09-10

## 2025-09-15 ENCOUNTER — APPOINTMENT (OUTPATIENT)
Dept: NEPHROLOGY | Facility: CLINIC | Age: 50
End: 2025-09-15
Payer: MEDICAID

## 2025-09-15 VITALS — DIASTOLIC BLOOD PRESSURE: 66 MMHG | SYSTOLIC BLOOD PRESSURE: 106 MMHG | HEART RATE: 92 BPM

## 2025-09-15 DIAGNOSIS — E11.9 TYPE 2 DIABETES MELLITUS W/OUT COMPLICATIONS: ICD-10-CM

## 2025-09-15 DIAGNOSIS — I10 ESSENTIAL (PRIMARY) HYPERTENSION: ICD-10-CM

## 2025-09-15 DIAGNOSIS — N20.0 CALCULUS OF KIDNEY: ICD-10-CM

## 2025-09-15 DIAGNOSIS — E87.5 HYPERKALEMIA: ICD-10-CM

## 2025-09-15 DIAGNOSIS — E83.52 HYPERCALCEMIA: ICD-10-CM

## 2025-09-15 DIAGNOSIS — R80.9 PROTEINURIA, UNSPECIFIED: ICD-10-CM

## 2025-09-15 PROCEDURE — G2211 COMPLEX E/M VISIT ADD ON: CPT | Mod: NC

## 2025-09-15 PROCEDURE — 99215 OFFICE O/P EST HI 40 MIN: CPT

## 2025-09-16 LAB
24R-OH-CALCIDIOL SERPL-MCNC: 59.2 PG/ML
25(OH)D3 SERPL-MCNC: 48.1 NG/ML
ALBUMIN SERPL ELPH-MCNC: 4.8 G/DL
ALBUMIN, RANDOM URINE: 8.1 MG/DL
ANION GAP SERPL CALC-SCNC: 15 MMOL/L
APPEARANCE: CLEAR
BACTERIA: NEGATIVE /HPF
BILIRUBIN URINE: NEGATIVE
BLOOD URINE: NEGATIVE
BUN SERPL-MCNC: 20 MG/DL
CALCIUM SERPL-MCNC: 11.1 MG/DL
CALCIUM SERPL-MCNC: 11.1 MG/DL
CAST: 0 /LPF
CHLORIDE SERPL-SCNC: 104 MMOL/L
CO2 SERPL-SCNC: 22 MMOL/L
COLOR: YELLOW
CREAT SERPL-MCNC: 1.04 MG/DL
CREAT SPEC-SCNC: 44 MG/DL
CREAT SPEC-SCNC: 44 MG/DL
CREAT/PROT UR: 0.5 RATIO
EGFRCR SERPLBLD CKD-EPI 2021: 87 ML/MIN/1.73M2
EPITHELIAL CELLS: 0 /HPF
GLUCOSE QUALITATIVE U: >=1000 MG/DL
GLUCOSE SERPL-MCNC: 150 MG/DL
KETONES URINE: NEGATIVE MG/DL
LEUKOCYTE ESTERASE URINE: NEGATIVE
MICROALBUMIN/CREAT 24H UR-RTO: 184 MG/G
MICROSCOPIC-UA: NORMAL
NITRITE URINE: NEGATIVE
PARATHYROID HORMONE INTACT: 5 PG/ML
PH URINE: 6.5
PHOSPHATE SERPL-MCNC: 3.1 MG/DL
POTASSIUM SERPL-SCNC: 5 MMOL/L
PROT UR-MCNC: 24 MG/DL
PROTEIN URINE: 30 MG/DL
RED BLOOD CELLS URINE: 0 /HPF
SODIUM SERPL-SCNC: 140 MMOL/L
SPECIFIC GRAVITY URINE: >1.03
UROBILINOGEN URINE: 0.2 MG/DL
WHITE BLOOD CELLS URINE: 0 /HPF

## 2025-09-17 ENCOUNTER — TRANSCRIPTION ENCOUNTER (OUTPATIENT)
Age: 50
End: 2025-09-17

## 2025-09-17 ENCOUNTER — APPOINTMENT (OUTPATIENT)
Dept: GASTROENTEROLOGY | Facility: CLINIC | Age: 50
End: 2025-09-17
Payer: MEDICAID

## 2025-09-17 VITALS
BODY MASS INDEX: 21.98 KG/M2 | SYSTOLIC BLOOD PRESSURE: 126 MMHG | WEIGHT: 157 LBS | HEIGHT: 71 IN | TEMPERATURE: 97 F | OXYGEN SATURATION: 100 % | DIASTOLIC BLOOD PRESSURE: 80 MMHG | HEART RATE: 65 BPM

## 2025-09-17 DIAGNOSIS — Z86.0100 PERSONAL HISTORY OF COLON POLYPS, UNSPECIFIED: ICD-10-CM

## 2025-09-17 DIAGNOSIS — N18.2 CHRONIC KIDNEY DISEASE, STAGE 2 (MILD): ICD-10-CM

## 2025-09-17 DIAGNOSIS — G43.909 MIGRAINE, UNSPECIFIED, NOT INTRACTABLE, W/OUT STATUS MIGRAINOSUS: ICD-10-CM

## 2025-09-17 DIAGNOSIS — K92.2 GASTROINTESTINAL HEMORRHAGE, UNSPECIFIED: ICD-10-CM

## 2025-09-17 PROCEDURE — G2211 COMPLEX E/M VISIT ADD ON: CPT | Mod: NC

## 2025-09-17 PROCEDURE — 99214 OFFICE O/P EST MOD 30 MIN: CPT

## 2025-09-17 RX ORDER — ZOLPIDEM TARTRATE 10 MG/1
10 TABLET ORAL
Refills: 0 | Status: ACTIVE | COMMUNITY

## 2025-09-19 ENCOUNTER — TRANSCRIPTION ENCOUNTER (OUTPATIENT)
Age: 50
End: 2025-09-19

## 2025-09-19 LAB
ACE BLD-CCNC: 32 U/L
ALBUMIN MFR SERPL ELPH: 59.9 %
ALBUMIN SERPL-MCNC: 4.5 G/DL
ALBUMIN/GLOB SERPL: 1.5 RATIO
ALPHA1 GLOB MFR SERPL ELPH: 3.8 %
ALPHA1 GLOB SERPL ELPH-MCNC: 0.3 G/DL
ALPHA2 GLOB MFR SERPL ELPH: 10.9 %
ALPHA2 GLOB SERPL ELPH-MCNC: 0.8 G/DL
B-GLOBULIN MFR SERPL ELPH: 12.1 %
B-GLOBULIN SERPL ELPH-MCNC: 0.9 G/DL
DEPRECATED KAPPA LC FREE/LAMBDA SER: 0.61 RATIO
GAMMA GLOB FLD ELPH-MCNC: 1 G/DL
GAMMA GLOB MFR SERPL ELPH: 13.3 %
IGA SERPL-MCNC: 146 MG/DL
IGG SERPL-MCNC: 966 MG/DL
IGM SERPL-MCNC: 83 MG/DL
INTERPRETATION SERPL IEP-IMP: NORMAL
KAPPA LC CSF-MCNC: 2.92 MG/DL
KAPPA LC SERPL-MCNC: 1.78 MG/DL
M PROTEIN SPEC IFE-MCNC: NORMAL
M TB IFN-G BLD-IMP: NEGATIVE
PROT SERPL-MCNC: 7.5 G/DL
PROT SERPL-MCNC: 7.5 G/DL
QUANTIFERON TB PLUS MITOGEN MINUS NIL: 7.38 IU/ML
QUANTIFERON TB PLUS NIL: 0.05 IU/ML
QUANTIFERON TB PLUS TB1 MINUS NIL: 0.01 IU/ML
QUANTIFERON TB PLUS TB2 MINUS NIL: 0.02 IU/ML